# Patient Record
Sex: FEMALE | Race: OTHER | Employment: UNEMPLOYED | URBAN - METROPOLITAN AREA
[De-identification: names, ages, dates, MRNs, and addresses within clinical notes are randomized per-mention and may not be internally consistent; named-entity substitution may affect disease eponyms.]

---

## 2020-03-12 ENCOUNTER — OFFICE VISIT (OUTPATIENT)
Dept: NEUROLOGY | Age: 40
End: 2020-03-12

## 2020-03-12 VITALS
DIASTOLIC BLOOD PRESSURE: 82 MMHG | RESPIRATION RATE: 18 BRPM | HEART RATE: 95 BPM | OXYGEN SATURATION: 98 % | SYSTOLIC BLOOD PRESSURE: 122 MMHG

## 2020-03-12 DIAGNOSIS — H53.2 DIPLOPIA: Primary | ICD-10-CM

## 2020-03-12 DIAGNOSIS — R90.89 MRI OF BRAIN ABNORMAL: ICD-10-CM

## 2020-03-12 DIAGNOSIS — R20.2 PARESTHESIA: ICD-10-CM

## 2020-03-12 RX ORDER — PREDNISONE 10 MG/1
TABLET ORAL
COMMUNITY
End: 2021-03-31

## 2020-03-12 NOTE — PATIENT INSTRUCTIONS
10 [facility]   Statement to Patients  April 1, 2014      In an effort to ensure the large volume of patient prescription refills is processed in the most efficient and expeditious manner, we are asking our patients to assist us by calling your Pharmacy for all prescription refills, this will include also your  Mail Order Pharmacy. The pharmacy will contact our office electronically to continue the refill process. Please do not wait until the last minute to call your pharmacy. We need at least 48 hours (2days) to fill prescriptions. We also encourage you to call your pharmacy before going to  your prescription to make sure it is ready. With regard to controlled substance prescription refill requests (narcotic refills) that need to be picked up at our office, we ask your cooperation by providing us with at least 72 hours (3days) notice that you will need a refill. We will not refill narcotic prescription refill requests after 4:00pm on any weekday, Monday through Thursday, or after 2:00pm on Fridays, or on the weekends. We encourage everyone to explore another way of getting your prescription refill request processed using [employer], our patient web portal through our electronic medical record system. [employer] is an efficient and effective way to communicate your medication request directly to the office and  downloadable as an shila on your smart phone . [employer] also features a review functionality that allows you to view your medication list as well as leave messages for your physician. Are you ready to get connected? If so please review the attatched instructions or speak to any of our staff to get you set up right away! Thank you so much for your cooperation. Should you have any questions please contact our Practice Administrator.     The Physicians and Staff,  [facility]

## 2020-03-12 NOTE — PROGRESS NOTES
[facility] PATIENT EVALUATION/CONSULTATION       PATIENT NAME: Rizwan Heck    MRN: [id number]    REASON FOR CONSULTATION: \"MS\"    03/12/20      Previous records (physician notes, laboratory reports, and radiology reports) and imaging studies were reviewed and summarized. My recommendations will be communicated back to the patient's physician(s) via electronic medical record and/or by [address] mail. HISTORY OF PRESENT ILLNESS:  Rizwan Heck is a 46 y.o.  female presenting for evaluation of MS. Interview is conducted with the assistance of a Cape Verdean phone . She presented with L intranuclear ophthalmoplegia with recent hospitalization at [facility] per OSH records with associated paresthesias of the L face/LUE/LLE and diplopia. She completed IVMP 1g x 3 doses with subsequent steroid taper. MRI Brain reportedly showed b/l subcortical white matter hyperintensities with enhancement of the L pericallosal/PV white matter and sasha concerning for demyelination vs alternative etiologies not limited to vasculitis, less likely infectious, lymphoproliferative process. MRA H/N with patent vasculature per records. S/P LP 21 W/0R/elevated CSF MBP  She reports new right sided paresthesias, intermittent, since her discharge from [facility] involving her RLE. Medical access has been limited due to lack of insurance. Left paresthesias are resolved. She notes weakness generalized but worse into the lower extremities. She is able to walk without significant imbalance or falls. She reports resolution of diplopia. She does note blurred vision b/l.       PAST MEDICAL HISTORY:  None    PAST SURGICAL HISTORY:  Past Surgical History:   Procedure Laterality Date    HX LAP CHOLECYSTECTOMY  2015       FAMILY HISTORY:   Reviewed- no history of neurologic disease    SOCIAL HISTORY:  Social History     Socioeconomic History    Marital status: SINGLE     Spouse name: Not on file    Number of children: Not on file    Years of education: Not on file    Highest education level: Not on file   Tobacco Use    Smoking status: Never Smoker   Substance and Sexual Activity    Alcohol use: No    Drug use: No         MEDICATIONS:   Current Outpatient Medications   Medication Sig Dispense Refill    predniSONE (DELTASONE) 10 mg tablet Take  by mouth daily (with breakfast). 60mg x3 days 40mg x3 days 20mg x3 days 10mg x4 days then stop             ALLERGIES:  Allergies   Allergen Reactions    Influenza Virus Vaccine Tvs 2012-13 (18+) Cell Everton Rash         REVIEW OF SYSTEMS:  10 point ROS reviewed with patient. Please see scanned document under media. PHYSICAL EXAM:  Vital Signs:   Visit Vitals  /82   Pulse 95   Resp 18   SpO2 98%        General Medical Exam:  General:  Well appearing, comfortable, in no apparent distress. Eyes/ENT: see cranial nerve examination. Neck: No masses appreciated. Full range of motion without tenderness. Respiratory:  Clear to auscultation, good air entry bilaterally. Cardiac:  Regular rate and rhythm, no murmur. GI:  Soft, non-tender, non-distended abdomen. Bowel sounds normal. No masses, organomegaly. Extremities:  No deformities, edema, or skin discoloration. Skin:  No rashes or lesions. Neurological:  · Mental Status:  Alert and oriented to person, place, and time with fluent speech. · Cranial Nerves:   CNII/III/IV/VI: visual fields full to confrontation, EOMI, PERRL, no ptosis or nystagmus. CN V: Facial sensation intact bilaterally, masseter 5/5   CN VII: Facial muscles symmetric and strong   CN VIII: Hears finger rub well bilaterally, intact vestibular function   CN IX/X: Normal palatal movement   CN XI: Full strength shoulder shrug bilaterally   CN XII: Tongue protrusion full and midline without fasciculation or atrophy  · Motor: Normal tone and muscle bulk with no pronator drift.  .  Individual muscle group testing:  Shoulder abduction:   Left:5/5 Right : 5/5    Shoulder adduction:   Left:5/5   Right : 5/5    Elbow flexion:      Left:5/5   Right : 5/5  Elbow extension:    Left:5/5   Right : 5/5   Wrist flexion:    Left:5/5   Right : 5/5  Wrist extension:    Left:5/5   Right : 5/5  Arm pronation:   Left:5/5   Right : 5/5  Arm supination:   Left:5/5   Right : 5/5    Finger flexion:    Left:5/5   Right : 5/5    Finger extension:   Left:5/5   Right : 5/5   Finger abduction:  Left:5/5   Right : 5/5   Finger adduction:   Left:5/5   Right : 5/5  Hip flexion:     Left:5/5   Right : 5/5         Hip extension:   Left:5/5   Right : 5/5    Knee flexion:    Left:5/5   Right : 5/5    Knee extension:   Left:5/5   Right : 5/5    Dorsiflexion:     Left:5/5   Right : 5/5  Plantar flexion:    Left:5/5   Right : 5/5      · MSRs: No crossed adductors or clonus. RIGHT  LEFT   Brachioradialis 1+ 1+   Biceps 1+ 1+   Triceps 1+ 1+   Knee 1+ 1+   Achilles 1+ 1+        Plantar response Downward Downward          · Sensation: Normal and symmetric perception of pinprick, temperature, light touch, proprioception, and vibration; (-) Romberg. · Coordination: No dysmetria. Normal rapid alternating movements; finger-to-nose and heel-to- shin testing are within normal limits. · Gait: Normal native gait    PERTINENT DATA:  OUTSIDE RECORDS:  The patient provided outside medical records which were reviewed during the course of the visit. The relevant detail are summarized above. ASSESSMENT:      ICD-10-CM ICD-9-CM    1. Diplopia H53.2 368.2 MRI BRAIN W WO CONT      MRI CERV SPINE W WO CONT   2. Paresthesia R20.2 782.0 MRI BRAIN W WO CONT      MRI CERV SPINE W WO CONT   3.  MRI of brain abnormal R90.89 793.0 MRI BRAIN W WO CONT      MRI CERV SPINE W WO CONT      CBC WITH AUTOMATED DIFF      METABOLIC PANEL, COMPREHENSIVE      TSH AND FREE T4      VITAMIN B12      RPR      LYME AB TOTAL W/RFLX W BLOT      SED RATE (ESR)      ELIANA IFA W/REFLEX  CASCADE      COPPER      VZV AB, IGG VZV AB, IGM      KENISHA VIRUS DNA BY PCR, QL      HIV 1/2 AG/AB, 4TH GENERATION,W RFLX CONFIRM      ANGIOTENSIN CONVERTING ENZYME   46year old Maori speaking female presenting for evaluation of possible MS. She was noted with diplopia associated with L ANASTASIA, L paresthesias during admission to [address] [place] [address]. Subsequent MRI Brain reportedly showed b/l subcortical white matter hyperintensities with enhancement of the L pericallosal/PV white matter and sasha concerning for demyelination vs alternative etiologies not limited to vasculitis, less likely infectious, lymphoproliferative process. MRA H/N with patent vasculature per records. S/P LP 21 W/0R/elevated CSF MBP. Neurological examination is non-focal today. She reports new RLE paresthesias since her discharge as well as intermittent blurred vision. Originally L paresthesias and ANASTASIA appear resolved s/p IVMP and subsequent steroid taper. We discussed obtaining repeat intracranial imaging as well as cervical MRI to assess lesion burden, evaluate for active demyelination and to aid with exclusion of alternative etiologies. Will complete laboratory investigations to exclude other system inflammatory/infectious/metabolic disorders which may mimic demyelinating disease. If after additional evaluation the diagnosis of MS is secured, we will discuss initiation of appropriate options for disease modifying therapies. PLAN:  · MRI Brain/Cervical WWO  · CBC, CMP, TSH, B12, ESR, Lyme, Syphilis, JCV, Copper, ELIANA, ACE, HIV, VZV titers  · Obtain prior CSF OCB results      Follow-up and Dispositions    · Return in about 4 weeks (around 4/9/2020). Cheri Gonzalez DO  Staff Neurologist  Diplomate, American Board of Psychiatry & Neurology       CC Referring provider:    Ana Melendez PA-C

## 2020-03-18 DIAGNOSIS — F40.240 CLAUSTROPHOBIA: Primary | ICD-10-CM

## 2020-03-18 NOTE — TELEPHONE ENCOUNTER
Consuella Simpson called from coordination of care, she is trying to schedule this patient for her MRI, but the patient notes that  usually orders a sedative prior to test. Nahid Herzog would like a call back when ordered and she will notify the patient when she schedules her for her test(s).    Thanks

## 2020-03-19 NOTE — TELEPHONE ENCOUNTER
Spoke with glenn [phone] for patient. Informed her  could prescribe Ativan to help with her claustrophobia with her MRI. She agrees. Advised her of directions, and to have a . She does not have an allergy to Ativan.

## 2020-03-20 LAB
ACE SERPL-CCNC: 29 U/L (ref 14–82)
ALBUMIN SERPL-MCNC: 3.8 G/DL (ref 3.8–4.8)
ALBUMIN/GLOB SERPL: 1.3 {RATIO} (ref 1.2–2.2)
ALP SERPL-CCNC: 93 IU/L (ref 39–117)
ALT SERPL-CCNC: 27 IU/L (ref 0–32)
ANA TITR SER IF: NEGATIVE {TITER}
AST SERPL-CCNC: 32 IU/L (ref 0–40)
B BURGDOR IGG+IGM SER-ACNC: <0.91 ISR (ref 0–0.9)
BASOPHILS # BLD AUTO: 0 X10E3/UL (ref 0–0.2)
BASOPHILS NFR BLD AUTO: 1 %
BILIRUB SERPL-MCNC: <0.2 MG/DL (ref 0–1.2)
BUN SERPL-MCNC: 4 MG/DL (ref 6–20)
BUN/CREAT SERPL: 7 (ref 9–23)
CALCIUM SERPL-MCNC: 8.4 MG/DL (ref 8.7–10.2)
CHLORIDE SERPL-SCNC: 107 MMOL/L (ref 96–106)
CO2 SERPL-SCNC: 22 MMOL/L (ref 20–29)
COPPER SERPL-MCNC: 143 UG/DL (ref 72–166)
CREAT SERPL-MCNC: 0.6 MG/DL (ref 0.57–1)
EOSINOPHIL # BLD AUTO: 0 X10E3/UL (ref 0–0.4)
EOSINOPHIL NFR BLD AUTO: 1 %
ERYTHROCYTE [DISTWIDTH] IN BLOOD BY AUTOMATED COUNT: 13 % (ref 11.7–15.4)
ERYTHROCYTE [SEDIMENTATION RATE] IN BLOOD BY WESTERGREN METHOD: 30 MM/HR (ref 0–32)
GLOBULIN SER CALC-MCNC: 2.9 G/DL (ref 1.5–4.5)
GLUCOSE SERPL-MCNC: 101 MG/DL (ref 65–99)
HCT VFR BLD AUTO: 44.4 % (ref 34–46.6)
HGB BLD-MCNC: 14.6 G/DL (ref 11.1–15.9)
HIV 1+2 AB+HIV1 P24 AG SERPL QL IA: NON REACTIVE
IMM GRANULOCYTES # BLD AUTO: 0 X10E3/UL (ref 0–0.1)
IMM GRANULOCYTES NFR BLD AUTO: 0 %
JCPYV DNA SPEC QL NAA+PROBE: NEGATIVE
LYMPHOCYTES # BLD AUTO: 1.6 X10E3/UL (ref 0.7–3.1)
LYMPHOCYTES NFR BLD AUTO: 43 %
MCH RBC QN AUTO: 31.4 PG (ref 26.6–33)
MCHC RBC AUTO-ENTMCNC: 32.9 G/DL (ref 31.5–35.7)
MCV RBC AUTO: 96 FL (ref 79–97)
MONOCYTES # BLD AUTO: 0.3 X10E3/UL (ref 0.1–0.9)
MONOCYTES NFR BLD AUTO: 9 %
NEUTROPHILS # BLD AUTO: 1.7 X10E3/UL (ref 1.4–7)
NEUTROPHILS NFR BLD AUTO: 46 %
PLATELET # BLD AUTO: 243 X10E3/UL (ref 150–450)
POTASSIUM SERPL-SCNC: 3.8 MMOL/L (ref 3.5–5.2)
PROT SERPL-MCNC: 6.7 G/DL (ref 6–8.5)
RBC # BLD AUTO: 4.65 X10E6/UL (ref 3.77–5.28)
RPR SER QL: NON REACTIVE
SODIUM SERPL-SCNC: 142 MMOL/L (ref 134–144)
T4 FREE SERPL-MCNC: 1.23 NG/DL (ref 0.82–1.77)
TSH SERPL DL<=0.005 MIU/L-ACNC: 0.74 UIU/ML (ref 0.45–4.5)
VIT B12 SERPL-MCNC: 659 PG/ML (ref 232–1245)
VZV IGG SER IA-ACNC: 341 INDEX
VZV IGM SER IA-ACNC: <0.91 INDEX (ref 0–0.9)
WBC # BLD AUTO: 3.6 X10E3/UL (ref 3.4–10.8)

## 2020-03-20 RX ORDER — LORAZEPAM 0.5 MG/1
TABLET ORAL
Qty: 2 TAB | Refills: 0 | Status: SHIPPED | OUTPATIENT
Start: 2020-03-20 | End: 2021-03-31

## 2020-04-15 ENCOUNTER — TELEPHONE (OUTPATIENT)
Dept: NEUROLOGY | Age: 40
End: 2020-04-15

## 2020-04-15 NOTE — TELEPHONE ENCOUNTER
Speak with patient through . Informed patient that  states that she would like  patient to have MRI's done sooner than end of May. She verbalized understanding. Requesting the scheduling service call her to reschedule for a sooner appointment.      #[phone]

## 2020-04-18 ENCOUNTER — HOSPITAL ENCOUNTER (OUTPATIENT)
Dept: MRI IMAGING | Age: 40
Discharge: HOME | End: 2020-04-18
Attending: PSYCHIATRY & NEUROLOGY
Payer: SUBSIDIZED

## 2020-04-18 DIAGNOSIS — H53.2 DIPLOPIA: ICD-10-CM

## 2020-04-18 DIAGNOSIS — R90.89 MRI OF BRAIN ABNORMAL: ICD-10-CM

## 2020-04-18 DIAGNOSIS — R20.2 PARESTHESIA: ICD-10-CM

## 2020-04-18 PROCEDURE — A9575 INJ GADOTERATE MEGLUMI 0.1ML: HCPCS | Performed by: PSYCHIATRY & NEUROLOGY

## 2020-04-18 PROCEDURE — 70553 MRI BRAIN STEM W/O & W/DYE: CPT

## 2020-04-18 PROCEDURE — 72156 MRI NECK SPINE W/O & W/DYE: CPT

## 2020-04-18 PROCEDURE — 74011250636 HC RX REV CODE- 250/636: Performed by: PSYCHIATRY & NEUROLOGY

## 2020-04-18 RX ORDER — GADOTERATE MEGLUMINE 376.9 MG/ML
18 INJECTION INTRAVENOUS
Status: COMPLETED | OUTPATIENT
Start: 2020-04-18 | End: 2020-04-18

## 2020-04-18 RX ADMIN — GADOTERATE MEGLUMINE 18 ML: 376.9 INJECTION INTRAVENOUS at 13:00

## 2020-04-29 ENCOUNTER — VIRTUAL VISIT (OUTPATIENT)
Dept: NEUROLOGY | Age: 40
End: 2020-04-29

## 2020-04-29 VITALS — WEIGHT: 187 LBS | HEIGHT: 60 IN | BODY MASS INDEX: 36.71 KG/M2 | RESPIRATION RATE: 18 BRPM

## 2020-04-29 DIAGNOSIS — Z86.69 H/O DIPLOPIA: ICD-10-CM

## 2020-04-29 DIAGNOSIS — R20.2 PARESTHESIA: ICD-10-CM

## 2020-04-29 DIAGNOSIS — G35 MULTIPLE SCLEROSIS (HCC): Primary | ICD-10-CM

## 2020-04-29 NOTE — PROGRESS NOTES
Neurology Clinic Follow up Note    Patient ID:  Efrain Swann  [id number]  15 y.o.  1980      Ms. Jarod Patrick is here for follow up today of  Chief Complaint   Patient presents with    Multiple Sclerosis      This is a telemedicine visit that was performed with the originating site at [facility] and the distant site at patient's home. Verbal consent to participate in video visit was obtained. The patient was identified by name and date of birth. This visit occurred during the Coronavirus [phone]) [facility] Emergency. I discussed with the patient the nature of our telemedicine visits, that:     I would evaluate the patient and recommend diagnostics and treatments based on my assessment   Our sessions are not being recorded and that personal health information is protected   Our team would provide follow up care in person if/when the patient needs it      Last Appointment With Me:  3/12/2020     \"39 y.o.  female presenting for evaluation of MS. Interview is conducted with the assistance of a Kiswahili phone . She presented with L intranuclear ophthalmoplegia with recent hospitalization at [address] [place] [address] per OSH records with associated paresthesias of the L face/LUE/LLE and diplopia. She completed IVMP 1g x 3 doses with subsequent steroid taper. MRI Brain reportedly showed b/l subcortical white matter hyperintensities with enhancement of the L pericallosal/PV white matter and sasha concerning for demyelination vs alternative etiologies not limited to vasculitis, less likely infectious, lymphoproliferative process. MRA H/N with patent vasculature per records. S/P LP 21 W/0R/elevated CSF MBP  She reports new right sided paresthesias, intermittent, since her discharge from [address] [place] [address] involving her RLE. Medical access has been limited due to lack of insurance. Left paresthesias are resolved. She notes weakness generalized but worse into the lower extremities.   She is able to walk without significant imbalance or falls. She reports resolution of diplopia. She does note blurred vision b/l. \"   Interval History:    [id number]  Patient denies new focal neurologic deficits including paresthesias, focal weakness, vision loss. Diplopia has resolved. She denies gait instability/falls, memory impairment. She reports some recent dysuria being investigated by her PCP. MRI Brain completed without evidence of active demyelination. PMHx/ PSHx/ FHx/ SHx:  Reviewed and unchanged previous visit. Past Medical History:   Diagnosis Date    Dysuria     Elevated lipids     Multiple sclerosis (HCC)          ROS:  Comprehensive review of systems negative except for as noted above. Objective:       Meds:  Current Outpatient Medications   Medication Sig Dispense Refill    LORazepam (ATIVAN) 0.5 mg tablet Take 1 tab 30 minutes prior to imaging. May repeat once if needed. Max daily dose 1mg. 2 Tab 0    predniSONE (DELTASONE) 10 mg tablet Take  by mouth daily (with breakfast). 60mg x3 days 40mg x3 days 20mg x3 days 10mg x4 days then stop         Exam:  Visit Vitals  Resp 18   Ht 5' (1.524 m)   Wt 84.8 kg (187 lb)   BMI 36.52 kg/m²   Due to this being a TeleHealth evaluation, many elements of the physical examination are unable to be assessed. Unable to connect for examination. Speech is fluent, alert/oriented x 3. LABS  Results for orders placed or performed in visit on 03/12/20   CBC WITH AUTOMATED DIFF   Result Value Ref Range    WBC 3.6 3.4 - 10.8 x10E3/uL    RBC 4.65 3.77 - 5.28 x10E6/uL    HGB 14.6 11.1 - 15.9 g/dL    HCT 44.4 34.0 - 46.6 %    MCV 96 79 - 97 fL    MCH 31.4 26.6 - 33.0 pg    MCHC 32.9 31.5 - 35.7 g/dL    RDW 13.0 11.7 - 15.4 %    PLATELET 047 289 - 469 x10E3/uL    NEUTROPHILS 46 Not Estab. %    Lymphocytes 43 Not Estab. %    MONOCYTES 9 Not Estab. %    EOSINOPHILS 1 Not Estab. %    BASOPHILS 1 Not Estab. %    ABS.  NEUTROPHILS 1.7 1.4 - 7.0 x10E3/uL Abs Lymphocytes 1.6 0.7 - 3.1 x10E3/uL    ABS. MONOCYTES 0.3 0.1 - 0.9 x10E3/uL    ABS. EOSINOPHILS 0.0 0.0 - 0.4 x10E3/uL    ABS. BASOPHILS 0.0 0.0 - 0.2 x10E3/uL    IMMATURE GRANULOCYTES 0 Not Estab. %    ABS. IMM. GRANS. 0.0 0.0 - 0.1 Z04L4/HB   METABOLIC PANEL, COMPREHENSIVE   Result Value Ref Range    Glucose 101 (H) 65 - 99 mg/dL    BUN 4 (L) 6 - 20 mg/dL    Creatinine 0.60 0.57 - 1.00 mg/dL    GFR est non- >59 mL/min/1.73    GFR est  >59 mL/min/1.73    BUN/Creatinine ratio 7 (L) 9 - 23    Sodium 142 134 - 144 mmol/L    Potassium 3.8 3.5 - 5.2 mmol/L    Chloride 107 (H) 96 - 106 mmol/L    CO2 22 20 - 29 mmol/L    Calcium 8.4 (L) 8.7 - 10.2 mg/dL    Protein, total 6.7 6.0 - 8.5 g/dL    Albumin 3.8 3.8 - 4.8 g/dL    GLOBULIN, TOTAL 2.9 1.5 - 4.5 g/dL    A-G Ratio 1.3 1.2 - 2.2    Bilirubin, total <0.2 0.0 - 1.2 mg/dL    Alk.  phosphatase 93 39 - 117 IU/L    AST (SGOT) 32 0 - 40 IU/L    ALT (SGPT) 27 0 - 32 IU/L   TSH AND FREE T4   Result Value Ref Range    TSH 0.744 0.450 - 4.500 uIU/mL    T4, Free 1.23 0.82 - 1.77 ng/dL   VITAMIN B12   Result Value Ref Range    Vitamin B12 659 232 - 1,245 pg/mL   RPR   Result Value Ref Range    RPR Non Reactive Non Reactive   LYME AB TOTAL W/RFLX W BLOT   Result Value Ref Range    Lyme Ab, IgG/IgM <0.91 0.00 - 0.90 ISR   SED RATE (ESR)   Result Value Ref Range    Sed rate (ESR) 30 0 - 32 mm/hr   ELIANA IFA W/REFLEX  CASCADE   Result Value Ref Range    Antinuclear Abs, IFA Negative    COPPER   Result Value Ref Range    Copper, serum 143 72 - 166 ug/dL   VZV AB, IGG   Result Value Ref Range    VARICELLA ZOSTER  Immune >165 index   VZV AB, IGM   Result Value Ref Range    V. zoster Ab, IgM <0.91 0.00 - 0.90 index   KENISHA VIRUS DNA BY PCR, QL   Result Value Ref Range    KENISHA VIRUS DNA,PCR (WHOLE BLOOD) Negative Negative   HIV 1/2 AG/AB, 4TH GENERATION,W RFLX CONFIRM   Result Value Ref Range    HIV SCREEN 4TH GENERATION WRFX Non Reactive Non Reactive   ANGIOTENSIN CONVERTING ENZYME   Result Value Ref Range    Angiotensin Converting Enzyme (ACE) 29 14 - 82 U/L       IMAGING:  MRI Results (maximum last 3): [place] from [place]/20   MRI CERV SPINE W WO CONT    Narrative EXAM: MRI CERV SPINE W WO CONT  Clinical history: Diplopia, intranuclear ophthalmoplegia. Evaluate for  demyelination. INDICATION: h/o ANASTASIA, paresthesias eval for demyelination vs other etiology. Diplopia    COMPARISON: None    TECHNIQUE: MR imaging of the cervical spine was performed using the following  sequences: sagittal T1, T2, STIR;  axial T2, T1 prior to and following contrast  administration. CONTRAST: 18 mL of Dotarem. FINDINGS:    Increased T2 signal intensity foci in the sasha and medulla. No abnormal cord  signal intensity. Vertebral body heights are maintained. Marrow signal is  normal.    The craniocervical junction is intact. The course, caliber, and signal intensity  of the spinal cord are normal. There is no pathologic intrathecal enhancement. The paraspinal soft tissues are within normal limits. The left vertebral artery  is dominant. C2-C3: No herniation or stenosis. C3-C4: No herniation or stenosis. C4-C5: No herniation or stenosis. C5-C6: No herniation or stenosis. C6-C7: No herniation or stenosis. C7-T1: No herniation or stenosis. Impression IMPRESSION:  Normal MRI of the cervical spine. No evidence of cervical cord demyelinating disease burden. MRI BRAIN W WO CONT    Narrative EXAM:  MRI BRAIN W WO CONT  Clinical history: Paresthesias, possible demyelination  INDICATION:    h/o ANASTASIA, paresthesias eval for demyelination vs other etiology    COMPARISON:  None. CONTRAST: 18 ml Dotarem. TECHNIQUE:    Multiplanar multisequence acquisition without and with contrast of the brain.     FINDINGS:  There are minimal periventricular and scattered foci of abnormal increased T2  signal intensity noted, centrum semiovale, sasha and medulla. There is no  abnormal postcontrast enhancement to suggest active demyelination. Cavernous  sinuses are symmetric. There is no intracranial mass, hemorrhage or acute infarction. Headaches. There is no abnormal parenchymal enhancement. There is no abnormal  meningeal enhancement demonstrated. The brain architecture is normal. There is  no evidence of midline shift or mass-effect. The ventricles are normal in size,  position and configuration. There are no extra-axial fluid collections. Major  intracranial vascular flow-voids are unremarkable. The orbits are grossly  symmetric. Dural venous sinuses are grossly unremarkable. There is no Chiari or  syrinx. Pituitary and infundibulum grossly unremarkable. Cerebellopontine angles  are unremarkable. No skull base mass is identified. Cavernous sinuses are  symmetric. The mastoid air cells and are well pneumatized and clear. Impression IMPRESSION:  Minimal abnormal increased T2 signal intensity predominantly periventricular but  also noted within the sasha and pontomedullary junction, differential includes  demyelinating process. No intracranial mass, hemorrhage or evidence of acute infarction. Otherwise unremarkable MRI of the brain. Assessment:     Encounter Diagnoses     ICD-10-CM ICD-9-CM   1. Multiple sclerosis ([facility] [address] G35 340   2. Paresthesia R20.2 782.0   3. H/O diplopia Z80.75 V16.53   46year old Kuwaiti speaking female here for f/u due to abnormal MRI and h/o diplopia associated with L ANASTASIA, L paresthesias during admission to [address] [place] [address]. Subsequent MRI Brain revealed b/l subcortical white matter hyperintensities with enhancement of the L pericallosal/PV white matter and sasha concerning for active demyelination vs alternative etiology. MRA H/N with patent vasculature per records. S/P LP 21 W/0R/elevated CSF MBP, 0 OCBs. Neurological examination is non-focal.  L paresthesias and ANASTASIA appear resolved s/p IVMP and subsequent steroid taper. Repeat neuroimaging with contrast was reviewed today showing no evidence of active demyelination, mild T2 primarily subcortical hyperintensities mainly localized to the pericallosal/PV white matter and sasha. No evidence of cervical lesions. Laboratory investigations completed without evidence of underlying systemic inflammatory/infectious or metabolic disorders which may mimic demyelinating disease. We reviewed the multiple sclerosis diagnosis, prognosis, and implications. We reviewed the 3-pronged treatment strategy: treating acute flares, using preventative treatments to prevent future relapses and brain lesions, and treating residual symptoms. For long-term treatment, I recommend initiation of Copaxone. Discussed medication dosage, usage, goals of therapy, and side effects. Information kit was provided; start-up form completed. Plan:   Initiate Copaxone injections    Follow-up and Dispositions    · Return in about 3 months (around 7/29/2020).        Signed:  Heraclio Hill DO  4/29/2020  9:09 AM

## 2020-05-07 ENCOUNTER — TELEPHONE (OUTPATIENT)
Dept: NEUROLOGY | Age: 40
End: 2020-05-07

## 2020-07-28 NOTE — TELEPHONE ENCOUNTER
\"Z\", pt's , calling stating Copaxone is not available and insurance company stated Creig Ramp is a good alternative.  Please call back

## 2020-07-29 NOTE — TELEPHONE ENCOUNTER
Spoke with SHANON, informed her  says it's okay for patient to take generic copaxone. She is requesting last office note be faxed to her.

## 2020-07-31 NOTE — TELEPHONE ENCOUNTER
Called [employer] to update Rx to generic for Copaxone. Luke Toribio states [employer] does not distribute generic Copaxone. To call patient's insurance. Called [employer]- Closed at 12:30pm today. Will attempt again next week.

## 2020-08-11 NOTE — TELEPHONE ENCOUNTER
Spoke with Radha Rodríguez, rep for Copaxone. She states to send generic Copaxone order to [facility] on [address] [facility].

## 2020-08-26 RX ORDER — GLATIRAMER 40 MG/ML
40 INJECTION, SOLUTION SUBCUTANEOUS
Qty: 13 SYRINGE | Refills: 5 | Status: SHIPPED | OUTPATIENT
Start: 2020-08-27 | End: 2021-03-31

## 2020-08-27 ENCOUNTER — TELEPHONE (OUTPATIENT)
Dept: NEUROLOGY | Facility: CLINIC | Age: 40
End: 2020-08-27

## 2020-09-01 ENCOUNTER — TELEPHONE (OUTPATIENT)
Dept: NEUROLOGY | Facility: CLINIC | Age: 40
End: 2020-09-01

## 2020-09-01 NOTE — TELEPHONE ENCOUNTER
----- Message from Yuan Lock sent at 9/1/2020  3:00 PM EDT -----  Regarding: Dr Shaneka Woodall  Patient return call    Caller's first and last name and relationship (if not the patient): /[facility]      Best contact number(s):[phone]      Whose call is being returned: Nancy Madison      Details to clarify the request: regarding information on medication pt was prescribed      Yuan Lock

## 2020-09-02 NOTE — TELEPHONE ENCOUNTER
Spoke with SHANON, she states she previously tried doing patient assistance but it was an issue because they wanted to know if patient was documented.  Informed her I would call patient assistance and try to get more information

## 2020-09-10 NOTE — TELEPHONE ENCOUNTER
Spoke with patient assistance with [employer] for Copaxone. They do not supply assistance to patient's who are undocumented. I will contact [employer] for patient to see if they can be of any assistance.

## 2020-09-14 NOTE — TELEPHONE ENCOUNTER
Spoke with Miguel Li with [employer]. She states [facility] will be able to assist patient with getting medication, she will be by later today with form.

## 2020-09-15 ENCOUNTER — TELEPHONE (OUTPATIENT)
Dept: NEUROLOGY | Facility: CLINIC | Age: 40
End: 2020-09-15

## 2020-09-15 DIAGNOSIS — G35 MULTIPLE SCLEROSIS (HCC): Primary | ICD-10-CM

## 2020-09-16 NOTE — TELEPHONE ENCOUNTER
Left message for Z that  wants patient to start Rebif for her MS but will need to have labs completed before hand. Informed her I can fax or if she wants to bring patient to office and go to [address] [place] [address] that works as well.

## 2020-09-23 ENCOUNTER — TELEPHONE (OUTPATIENT)
Dept: NEUROLOGY | Facility: CLINIC | Age: 40
End: 2020-09-23

## 2020-09-23 NOTE — TELEPHONE ENCOUNTER
Calling stating she has been working with Juancarlos Grip for about 2 months and needs a call back from Juancarlos James. Medication is wearing out (almost time to ) and they are very concerned about it.

## 2020-09-26 LAB
ALBUMIN SERPL-MCNC: 4.1 G/DL (ref 3.8–4.8)
ALBUMIN/GLOB SERPL: 1.4 {RATIO} (ref 1.2–2.2)
ALP SERPL-CCNC: 106 IU/L (ref 39–117)
ALT SERPL-CCNC: 18 IU/L (ref 0–32)
AST SERPL-CCNC: 22 IU/L (ref 0–40)
BASOPHILS # BLD AUTO: 0 X10E3/UL (ref 0–0.2)
BASOPHILS NFR BLD AUTO: 1 %
BILIRUB SERPL-MCNC: 0.2 MG/DL (ref 0–1.2)
BUN SERPL-MCNC: 8 MG/DL (ref 6–20)
BUN/CREAT SERPL: 10 (ref 9–23)
CALCIUM SERPL-MCNC: 8.9 MG/DL (ref 8.7–10.2)
CHLORIDE SERPL-SCNC: 106 MMOL/L (ref 96–106)
CO2 SERPL-SCNC: 21 MMOL/L (ref 20–29)
CREAT SERPL-MCNC: 0.77 MG/DL (ref 0.57–1)
EOSINOPHIL # BLD AUTO: 0.1 X10E3/UL (ref 0–0.4)
EOSINOPHIL NFR BLD AUTO: 1 %
ERYTHROCYTE [DISTWIDTH] IN BLOOD BY AUTOMATED COUNT: 13.1 % (ref 11.7–15.4)
GLOBULIN SER CALC-MCNC: 3 G/DL (ref 1.5–4.5)
GLUCOSE SERPL-MCNC: 96 MG/DL (ref 65–99)
HCT VFR BLD AUTO: 43.8 % (ref 34–46.6)
HGB BLD-MCNC: 14.8 G/DL (ref 11.1–15.9)
IMM GRANULOCYTES # BLD AUTO: 0 X10E3/UL (ref 0–0.1)
IMM GRANULOCYTES NFR BLD AUTO: 0 %
LYMPHOCYTES # BLD AUTO: 1.7 X10E3/UL (ref 0.7–3.1)
LYMPHOCYTES NFR BLD AUTO: 27 %
MCH RBC QN AUTO: 31.7 PG (ref 26.6–33)
MCHC RBC AUTO-ENTMCNC: 33.8 G/DL (ref 31.5–35.7)
MCV RBC AUTO: 94 FL (ref 79–97)
MONOCYTES # BLD AUTO: 0.5 X10E3/UL (ref 0.1–0.9)
MONOCYTES NFR BLD AUTO: 8 %
NEUTROPHILS # BLD AUTO: 3.9 X10E3/UL (ref 1.4–7)
NEUTROPHILS NFR BLD AUTO: 63 %
PLATELET # BLD AUTO: 262 X10E3/UL (ref 150–450)
POTASSIUM SERPL-SCNC: 4.7 MMOL/L (ref 3.5–5.2)
PROT SERPL-MCNC: 7.1 G/DL (ref 6–8.5)
RBC # BLD AUTO: 4.67 X10E6/UL (ref 3.77–5.28)
SODIUM SERPL-SCNC: 141 MMOL/L (ref 134–144)
TSH SERPL DL<=0.005 MIU/L-ACNC: 1.12 UIU/ML (ref 0.45–4.5)
WBC # BLD AUTO: 6.2 X10E3/UL (ref 3.4–10.8)

## 2020-09-28 ENCOUNTER — DOCUMENTATION ONLY (OUTPATIENT)
Dept: NEUROLOGY | Facility: CLINIC | Age: 40
End: 2020-09-28

## 2020-10-30 ENCOUNTER — TELEPHONE (OUTPATIENT)
Dept: NEUROLOGY | Facility: CLINIC | Age: 40
End: 2020-10-30

## 2020-10-30 NOTE — TELEPHONE ENCOUNTER
----- Message from Rut Turcios sent at 10/30/2020  4:22 PM EDT -----  Regarding: Dr. Mathieu Soto telephone    Caller's first and last name: n/a  Reason for call: questions  Callback required yes/no and why: yes  Best contact number(s):[phone]  Details to clarify the request: pt started treatment on Monday, pt is also still breastfeeding pt wants to [facility] if the treatment will affect the baby

## 2020-11-02 NOTE — TELEPHONE ENCOUNTER
Spoke with SHANON patient's . Informed her per -Limit human data during lactation but generally considered ok to continue breastfeeding with Rebif.     Scheduled follow up for 12/2 at 11am.

## 2020-12-02 ENCOUNTER — OFFICE VISIT (OUTPATIENT)
Dept: NEUROLOGY | Age: 40
End: 2020-12-02
Payer: SUBSIDIZED

## 2020-12-02 VITALS
HEART RATE: 65 BPM | OXYGEN SATURATION: 98 % | DIASTOLIC BLOOD PRESSURE: 82 MMHG | WEIGHT: 187 LBS | BODY MASS INDEX: 36.52 KG/M2 | SYSTOLIC BLOOD PRESSURE: 130 MMHG | RESPIRATION RATE: 18 BRPM

## 2020-12-02 DIAGNOSIS — R90.89 MRI OF BRAIN ABNORMAL: ICD-10-CM

## 2020-12-02 DIAGNOSIS — G35 MULTIPLE SCLEROSIS (HCC): Primary | ICD-10-CM

## 2020-12-02 DIAGNOSIS — Z86.69 H/O DIPLOPIA: ICD-10-CM

## 2020-12-02 DIAGNOSIS — F40.240 CLAUSTROPHOBIA: Primary | ICD-10-CM

## 2020-12-02 PROCEDURE — 99214 OFFICE O/P EST MOD 30 MIN: CPT | Performed by: PSYCHIATRY & NEUROLOGY

## 2020-12-02 RX ORDER — ACETAMINOPHEN 325 MG/1
TABLET ORAL
COMMUNITY

## 2020-12-02 RX ORDER — INTERFERON BETA-1A 44 UG/.5ML
INJECTION, SOLUTION SUBCUTANEOUS
COMMUNITY
End: 2021-09-07 | Stop reason: SDUPTHER

## 2020-12-02 RX ORDER — LORAZEPAM 0.5 MG/1
TABLET ORAL
Qty: 2 TAB | Refills: 0 | Status: SHIPPED | OUTPATIENT
Start: 2020-12-02 | End: 2021-03-31

## 2020-12-02 NOTE — PATIENT INSTRUCTIONS
10 [facility]   Statement to Patients  April 1, 2014      In an effort to ensure the large volume of patient prescription refills is processed in the most efficient and expeditious manner, we are asking our patients to assist us by calling your Pharmacy for all prescription refills, this will include also your  Mail Order Pharmacy. The pharmacy will contact our office electronically to continue the refill process. Please do not wait until the last minute to call your pharmacy. We need at least 48 hours (2days) to fill prescriptions. We also encourage you to call your pharmacy before going to  your prescription to make sure it is ready. With regard to controlled substance prescription refill requests (narcotic refills) that need to be picked up at our office, we ask your cooperation by providing us with at least 72 hours (3days) notice that you will need a refill. We will not refill narcotic prescription refill requests after 4:00pm on any weekday, Monday through Thursday, or after 2:00pm on Fridays, or on the weekends. We encourage everyone to explore another way of getting your prescription refill request processed using Bestowed, our patient web portal through our electronic medical record system. Bestowed is an efficient and effective way to communicate your medication request directly to the office and  downloadable as an shila on your smart phone . Bestowed also features a review functionality that allows you to view your medication list as well as leave messages for your physician. Are you ready to get connected? If so please review the attatched instructions or speak to any of our staff to get you set up right away! Thank you so much for your cooperation. Should you have any questions please contact our Practice Administrator.     The Physicians and Staff,  [facility]

## 2020-12-03 LAB
ALBUMIN SERPL-MCNC: 3.7 G/DL (ref 3.8–4.8)
ALBUMIN/GLOB SERPL: 1.2 {RATIO} (ref 1.2–2.2)
ALP SERPL-CCNC: 100 IU/L (ref 39–117)
ALT SERPL-CCNC: 32 IU/L (ref 0–32)
AST SERPL-CCNC: 34 IU/L (ref 0–40)
BASOPHILS # BLD AUTO: 0 X10E3/UL (ref 0–0.2)
BASOPHILS NFR BLD AUTO: 0 %
BILIRUB SERPL-MCNC: 0.3 MG/DL (ref 0–1.2)
BUN SERPL-MCNC: 9 MG/DL (ref 6–20)
BUN/CREAT SERPL: 14 (ref 9–23)
CALCIUM SERPL-MCNC: 8.9 MG/DL (ref 8.7–10.2)
CHLORIDE SERPL-SCNC: 105 MMOL/L (ref 96–106)
CO2 SERPL-SCNC: 22 MMOL/L (ref 20–29)
CREAT SERPL-MCNC: 0.64 MG/DL (ref 0.57–1)
EOSINOPHIL # BLD AUTO: 0 X10E3/UL (ref 0–0.4)
EOSINOPHIL NFR BLD AUTO: 1 %
ERYTHROCYTE [DISTWIDTH] IN BLOOD BY AUTOMATED COUNT: 12.9 % (ref 11.7–15.4)
GLOBULIN SER CALC-MCNC: 3.1 G/DL (ref 1.5–4.5)
GLUCOSE SERPL-MCNC: 109 MG/DL (ref 65–99)
HCT VFR BLD AUTO: 44.3 % (ref 34–46.6)
HGB BLD-MCNC: 15.2 G/DL (ref 11.1–15.9)
IMM GRANULOCYTES # BLD AUTO: 0 X10E3/UL (ref 0–0.1)
IMM GRANULOCYTES NFR BLD AUTO: 0 %
LYMPHOCYTES # BLD AUTO: 1.8 X10E3/UL (ref 0.7–3.1)
LYMPHOCYTES NFR BLD AUTO: 39 %
MCH RBC QN AUTO: 31.4 PG (ref 26.6–33)
MCHC RBC AUTO-ENTMCNC: 34.3 G/DL (ref 31.5–35.7)
MCV RBC AUTO: 92 FL (ref 79–97)
MONOCYTES # BLD AUTO: 0.3 X10E3/UL (ref 0.1–0.9)
MONOCYTES NFR BLD AUTO: 7 %
NEUTROPHILS # BLD AUTO: 2.4 X10E3/UL (ref 1.4–7)
NEUTROPHILS NFR BLD AUTO: 53 %
PLATELET # BLD AUTO: 274 X10E3/UL (ref 150–450)
POTASSIUM SERPL-SCNC: 4 MMOL/L (ref 3.5–5.2)
PROT SERPL-MCNC: 6.8 G/DL (ref 6–8.5)
RBC # BLD AUTO: 4.84 X10E6/UL (ref 3.77–5.28)
SODIUM SERPL-SCNC: 141 MMOL/L (ref 134–144)
TSH SERPL DL<=0.005 MIU/L-ACNC: 0.86 UIU/ML (ref 0.45–4.5)
WBC # BLD AUTO: 4.5 X10E3/UL (ref 3.4–10.8)

## 2020-12-18 ENCOUNTER — HOSPITAL ENCOUNTER (OUTPATIENT)
Dept: MRI IMAGING | Age: 40
Discharge: HOME | End: 2020-12-18
Attending: PSYCHIATRY & NEUROLOGY
Payer: SUBSIDIZED

## 2020-12-18 VITALS — BODY MASS INDEX: 37.11 KG/M2 | WEIGHT: 190 LBS

## 2020-12-18 DIAGNOSIS — G35 MULTIPLE SCLEROSIS (HCC): ICD-10-CM

## 2020-12-18 PROCEDURE — A9575 INJ GADOTERATE MEGLUMI 0.1ML: HCPCS | Performed by: PSYCHIATRY & NEUROLOGY

## 2020-12-18 PROCEDURE — 70553 MRI BRAIN STEM W/O & W/DYE: CPT

## 2020-12-18 PROCEDURE — 74011250636 HC RX REV CODE- 250/636: Performed by: PSYCHIATRY & NEUROLOGY

## 2020-12-18 RX ORDER — GADOTERATE MEGLUMINE 376.9 MG/ML
17 INJECTION INTRAVENOUS
Status: COMPLETED | OUTPATIENT
Start: 2020-12-18 | End: 2020-12-18

## 2020-12-18 RX ADMIN — GADOTERATE MEGLUMINE 17 ML: 376.9 INJECTION INTRAVENOUS at 11:03

## 2021-03-31 ENCOUNTER — OFFICE VISIT (OUTPATIENT)
Dept: NEUROLOGY | Age: 41
End: 2021-03-31
Payer: SUBSIDIZED

## 2021-03-31 VITALS
DIASTOLIC BLOOD PRESSURE: 70 MMHG | SYSTOLIC BLOOD PRESSURE: 118 MMHG | HEART RATE: 92 BPM | RESPIRATION RATE: 18 BRPM | OXYGEN SATURATION: 98 %

## 2021-03-31 DIAGNOSIS — G35 MULTIPLE SCLEROSIS (HCC): Primary | ICD-10-CM

## 2021-03-31 DIAGNOSIS — H53.2 DIPLOPIA: ICD-10-CM

## 2021-03-31 PROCEDURE — 99214 OFFICE O/P EST MOD 30 MIN: CPT | Performed by: PSYCHIATRY & NEUROLOGY

## 2021-03-31 NOTE — PATIENT INSTRUCTIONS
10 [facility]   Statement to Patients  April 1, 2014      In an effort to ensure the large volume of patient prescription refills is processed in the most efficient and expeditious manner, we are asking our patients to assist us by calling your Pharmacy for all prescription refills, this will include also your  Mail Order Pharmacy. The pharmacy will contact our office electronically to continue the refill process. Please do not wait until the last minute to call your pharmacy. We need at least 48 hours (2days) to fill prescriptions. We also encourage you to call your pharmacy before going to  your prescription to make sure it is ready. With regard to controlled substance prescription refill requests (narcotic refills) that need to be picked up at our office, we ask your cooperation by providing us with at least 72 hours (3days) notice that you will need a refill. We will not refill narcotic prescription refill requests after 4:00pm on any weekday, Monday through Thursday, or after 2:00pm on Fridays, or on the weekends. We encourage everyone to explore another way of getting your prescription refill request processed using 4meee, our patient web portal through our electronic medical record system. 4meee is an efficient and effective way to communicate your medication request directly to the office and  downloadable as an shila on your smart phone . 4meee also features a review functionality that allows you to view your medication list as well as leave messages for your physician. Are you ready to get connected? If so please review the attatched instructions or speak to any of our staff to get you set up right away! Thank you so much for your cooperation. Should you have any questions please contact our Practice Administrator.     The Physicians and Staff,  [facility] [address] [place] [address] Neurology Clinic   Statement to Patients  April 1, 2014      In an effort to ensure the large volume of patient prescription refills is processed in the most efficient and expeditious manner, we are asking our patients to assist us by calling your Pharmacy for all prescription refills, this will include also your  Mail Order Pharmacy. The pharmacy will contact our office electronically to continue the refill process. Please do not wait until the last minute to call your pharmacy. We need at least 48 hours (2days) to fill prescriptions. We also encourage you to call your pharmacy before going to  your prescription to make sure it is ready. With regard to controlled substance prescription refill requests (narcotic refills) that need to be picked up at our office, we ask your cooperation by providing us with at least 72 hours (3days) notice that you will need a refill. We will not refill narcotic prescription refill requests after 4:00pm on any weekday, Monday through Thursday, or after 2:00pm on Fridays, or on the weekends. We encourage everyone to explore another way of getting your prescription refill request processed using Ricebook, our patient web portal through our electronic medical record system. Ricebook is an efficient and effective way to communicate your medication request directly to the office and  downloadable as an shila on your smart phone . Ricebook also features a review functionality that allows you to view your medication list as well as leave messages for your physician. Are you ready to get connected? If so please review the attatched instructions or speak to any of our staff to get you set up right away! Thank you so much for your cooperation. Should you have any questions please contact our Practice Administrator.     The Physicians and Staff,  [address] [place] [address] Neurology [facility]

## 2021-03-31 NOTE — PROGRESS NOTES
Neurology Clinic Follow up Note    Patient ID:  Tia Benson  [id number]  45 y.o.  1980      Ms. Viviane Sánchez is here for follow up today of  Chief Complaint   Patient presents with    Multiple Sclerosis     Previous records (physician notes, laboratory reports, and radiology reports) and imaging studies were reviewed and summarized. My recommendations will be communicated back to the patient's physician(s) via electronic medical record and/or by [address] mail. Last Appointment With Me:  12/2/2020    \"40 y.o.  female presenting for evaluation of MS. Interview is conducted with the assistance of a Burmese phone . She presented with L intranuclear ophthalmoplegia with recent hospitalization at [facility] per OSH records with associated paresthesias of the L face/LUE/LLE and diplopia. She completed IVMP 1g x 3 doses with subsequent steroid taper. MRI Brain reportedly showed b/l subcortical white matter hyperintensities with enhancement of the L pericallosal/PV white matter and sasha concerning for demyelination vs alternative etiologies not limited to vasculitis, less likely infectious, lymphoproliferative process. MRA H/N with patent vasculature per records. S/P LP 21 W/0R/elevated CSF MBP  She reports new right sided paresthesias, intermittent, since her discharge from [facility] involving her RLE. Medical access has been limited due to lack of insurance. Left paresthesias are resolved. She notes weakness generalized but worse into the lower extremities. She is able to walk without significant imbalance or falls. She reports resolution of diplopia. She does note blurred vision b/l. \"   Interval History:   Burmese phone  assist with translation today. Patient denies new focal neurologic deficits including paresthesias, focal weakness, vision loss, neuropathic pain symptoms. She notes a sensation of water inside her ears b/l intermittently. She reports ongoing fatigue. Diplopia is improved, worse when overheated. She reports occasional blurred vision. She denies gait instability/falls. She reports some short term memory impairment, unchanged. Unable to receive financial assistance for Copaxone injections due to undocumented status. She is presently maintained on Rebif injections and appears to be tolerating these well apart from some generalized ache 2 hours following her injections. She reports compliance with injections. PMHx/ PSHx/ FHx/ SHx:  Reviewed and unchanged previous visit. Past Medical History:   Diagnosis Date    Dysuria     Elevated lipids     Multiple sclerosis (HCC)          ROS:  Comprehensive review of systems negative except for as noted above. Objective:       Meds:  Current Outpatient Medications   Medication Sig Dispense Refill    interferon beta-1a (Rebif, with albumin,) 44 mcg/0.5 mL injection by SubCUTAneous route every Monday, Wednesday, Friday.  ibuprofen 100 mg tablet Take 100 mg by mouth every six (6) hours as needed for Pain.  acetaminophen (TylenoL) 325 mg tablet Take  by mouth every four (4) hours as needed for Pain. Exam:  Visit Vitals  /70   Pulse 92   Resp 18   SpO2 98%      NEUROLOGICAL EXAM:  General: Awake, alert, speech fluent  CN: PERRL, EOMI without nystagmus, VFF to confrontation, facial sensation and strength are normal and symmetric, hearing is intact to finger rub bilaterally, palate and tongue movements are intact and symmetric. Motor: Normal tone, bulk and strength bilaterally. Reflexes: 1/4 and symmetric, plantar stimulation is flexor. Coordination: FNF, HEATH, HTS intact. Sensation: LT intact throughout.   Gait: Normal based, stable    Labs were reviewed:  LABS  Results for orders placed or performed in visit on 12/02/20   CBC WITH AUTOMATED DIFF   Result Value Ref Range    WBC 4.5 3.4 - 10.8 x10E3/uL    RBC 4.84 3.77 - 5.28 x10E6/uL    HGB 15.2 11.1 - 15.9 g/dL    HCT 44.3 34.0 - 46.6 %    MCV 92 79 - 97 fL    MCH 31.4 26.6 - 33.0 pg    MCHC 34.3 31.5 - 35.7 g/dL    RDW 12.9 11.7 - 15.4 %    PLATELET 705 846 - 238 x10E3/uL    NEUTROPHILS 53 Not Estab. %    Lymphocytes 39 Not Estab. %    MONOCYTES 7 Not Estab. %    EOSINOPHILS 1 Not Estab. %    BASOPHILS 0 Not Estab. %    ABS. NEUTROPHILS 2.4 1.4 - 7.0 x10E3/uL    Abs Lymphocytes 1.8 0.7 - 3.1 x10E3/uL    ABS. MONOCYTES 0.3 0.1 - 0.9 x10E3/uL    ABS. EOSINOPHILS 0.0 0.0 - 0.4 x10E3/uL    ABS. BASOPHILS 0.0 0.0 - 0.2 x10E3/uL    IMMATURE GRANULOCYTES 0 Not Estab. %    ABS. IMM. GRANS. 0.0 0.0 - 0.1 T00P2/VE   METABOLIC PANEL, COMPREHENSIVE   Result Value Ref Range    Glucose 109 (H) 65 - 99 mg/dL    BUN 9 6 - 20 mg/dL    Creatinine 0.64 0.57 - 1.00 mg/dL    GFR est non- >59 mL/min/1.73    GFR est  >59 mL/min/1.73    BUN/Creatinine ratio 14 9 - 23    Sodium 141 134 - 144 mmol/L    Potassium 4.0 3.5 - 5.2 mmol/L    Chloride 105 96 - 106 mmol/L    CO2 22 20 - 29 mmol/L    Calcium 8.9 8.7 - 10.2 mg/dL    Protein, total 6.8 6.0 - 8.5 g/dL    Albumin 3.7 (L) 3.8 - 4.8 g/dL    GLOBULIN, TOTAL 3.1 1.5 - 4.5 g/dL    A-G Ratio 1.2 1.2 - 2.2    Bilirubin, total 0.3 0.0 - 1.2 mg/dL    Alk. phosphatase 100 39 - 117 IU/L    AST (SGOT) 34 0 - 40 IU/L    ALT (SGPT) 32 0 - 32 IU/L   TSH 3RD GENERATION   Result Value Ref Range    TSH 0.864 0.450 - 4.500 uIU/mL       IMAGING:  MRI Results (maximum last 3): Results from [place] encounter on 12/18/20   MRI BRAIN W WO CONT    Narrative EXAM:  MRI BRAIN W WO CONT    INDICATION:    assess for interval progression of MS lesions    COMPARISON:  MRI brain 4/18/2020. CONTRAST: 17 ml Dotarem. TECHNIQUE:    Multiplanar multisequence acquisition without and with contrast of the brain. FINDINGS:  There are multiple stable T2/FLAIR hyperintense lesions in the sasha and in the  right middle cerebellar peduncle.  A single T2/FLAIR hyperintense lesion in the  left frontal periventricular white matter is stable. There is no abnormally  restricted diffusion or enhancement associated with these lesions. No new  lesions are identified. The ventricles are normal in size and position. Incidental note of cavum vergae. There is no acute infarct, hemorrhage, extra-axial fluid collection, or mass  effect. There is no cerebellar tonsillar herniation. Expected arterial  flow-voids are present. The paranasal sinuses are clear. Trace right mastoid effusion. The orbital  contents are within normal limits. No significant osseous or scalp lesions are  identified. Impression IMPRESSION:   1. Stable infratentorial (predominantly in the sasha) and single supratentorial  white matter lesions with an appearance and pattern compatible with  demyelinating disease. No evidence of active demyelinating plaque. Results from [place] encounter on 04/18/20   MRI CERV SPINE W WO CONT    Narrative EXAM: MRI CERV SPINE W WO CONT  Clinical history: Diplopia, intranuclear ophthalmoplegia. Evaluate for  demyelination. INDICATION: h/o ANASTASIA, paresthesias eval for demyelination vs other etiology. Diplopia    COMPARISON: None    TECHNIQUE: MR imaging of the cervical spine was performed using the following  sequences: sagittal T1, T2, STIR;  axial T2, T1 prior to and following contrast  administration. CONTRAST: 18 mL of Dotarem. FINDINGS:    Increased T2 signal intensity foci in the sasha and medulla. No abnormal cord  signal intensity. Vertebral body heights are maintained. Marrow signal is  normal.    The craniocervical junction is intact. The course, caliber, and signal intensity  of the spinal cord are normal. There is no pathologic intrathecal enhancement. The paraspinal soft tissues are within normal limits. The left vertebral artery  is dominant. C2-C3: No herniation or stenosis. C3-C4: No herniation or stenosis. C4-C5: No herniation or stenosis. C5-C6: No herniation or stenosis.     C6-C7: No herniation or stenosis. C7-T1: No herniation or stenosis. Impression IMPRESSION:  Normal MRI of the cervical spine. No evidence of cervical cord demyelinating disease burden. MRI BRAIN W WO CONT    Narrative EXAM:  MRI BRAIN W WO CONT  Clinical history: Paresthesias, possible demyelination  INDICATION:    h/o ANASTASIA, paresthesias eval for demyelination vs other etiology    COMPARISON:  None. CONTRAST: 18 ml Dotarem. TECHNIQUE:    Multiplanar multisequence acquisition without and with contrast of the brain. FINDINGS:  There are minimal periventricular and scattered foci of abnormal increased T2  signal intensity noted, centrum semiovale, sasha and medulla. There is no  abnormal postcontrast enhancement to suggest active demyelination. Cavernous  sinuses are symmetric. There is no intracranial mass, hemorrhage or acute infarction. Headaches. There is no abnormal parenchymal enhancement. There is no abnormal  meningeal enhancement demonstrated. The brain architecture is normal. There is  no evidence of midline shift or mass-effect. The ventricles are normal in size,  position and configuration. There are no extra-axial fluid collections. Major  intracranial vascular flow-voids are unremarkable. The orbits are grossly  symmetric. Dural venous sinuses are grossly unremarkable. There is no Chiari or  syrinx. Pituitary and infundibulum grossly unremarkable. Cerebellopontine angles  are unremarkable. No skull base mass is identified. Cavernous sinuses are  symmetric. The mastoid air cells and are well pneumatized and clear. Impression IMPRESSION:  Minimal abnormal increased T2 signal intensity predominantly periventricular but  also noted within the sasha and pontomedullary junction, differential includes  demyelinating process. No intracranial mass, hemorrhage or evidence of acute infarction. Otherwise unremarkable MRI of the brain.            Assessment:     Encounter Diagnoses     ICD-10-CM ICD-9-CM   1. Multiple sclerosis ([facility] [address]  G35 340   2. Diplopia  H53.2 39.2   45year old Algerian speaking female here for f/u due to abnormal MRI and h/o diplopia associated with L ANASTASIA, L paresthesias during admission to [address] [place] [address]. Subsequent MRI Brain revealed b/l subcortical white matter hyperintensities with enhancement of the L pericallosal/PV white matter and sasha concerning for active demyelination vs alternative etiology. MRA H/N with patent vasculature per records. S/P LP 21 W/0R/elevated CSF MBP, 0 OCBs. Neurological examination is non-focal.  L paresthesias and ANASTASIA appear resolved s/p IVMP and subsequent steroid taper. Repeat neuroimaging with contrast 4/18/20 was reviewed showing no evidence of active demyelination, mild T2 primarily subcortical hyperintensities mainly localized to the pericallosal/PV white matter and sasha. No evidence of cervical lesions. Laboratory investigations completed without evidence of underlying systemic inflammatory/infectious or metabolic disorders which may mimic demyelinating disease. She denies MS exacerbation or new focal neurologic deficits since last visit. MRI Brain/Cervical last repeated 12/2020 and independently reviewed with overall stable MS lesion burden, no active demyelination. We reviewed the multiple sclerosis diagnosis, prognosis, and implications. We reviewed the 3-pronged treatment strategy: treating acute flares, using preventative treatments to prevent future relapses and brain lesions, and treating residual symptoms. For long-term treatment, I recommend continuation of Rebif. Discussed medication dosage, usage, goals of therapy, and side effects. Will continue periodic laboratory monitoring while on therapy. Plan:   Continue Rebif injections    Follow-up and Dispositions    · Return in about 6 months (around 9/30/2021).        I have discussed the diagnosis with the patient today and the intended plan as seen in the above orders with both the patient as well as referring provider and/or PCP via electronic correspondence. The patient has received an after-visit summary and questions were answered concerning future plans. I have discussed medication side effects and warnings with the patient as well.         Signed:  Ruben Hardy DO  3/31/2021

## 2021-04-01 ENCOUNTER — TELEPHONE (OUTPATIENT)
Dept: NEUROLOGY | Age: 41
End: 2021-04-01

## 2021-04-01 NOTE — TELEPHONE ENCOUNTER
LYNNE Oakley with the [employer] calling to see if it is okay for the patient to receive a covid vaccine. She wants to make sure there are no issues with the vaccine and her MS medications.    Her personal cell is [phone]

## 2021-04-02 NOTE — TELEPHONE ENCOUNTER
Left message for LYNNE Loving to call back.  Per -Yes, ok to proceed with vaccination from a neurologic perspective

## 2021-05-28 NOTE — TELEPHONE ENCOUNTER
Spoke with Meghna with [employer], informed her of situation. She states there are no further programs that her and her manager are aware of to help get patient assistance to get Copaxone or generic medication. done

## 2021-09-07 NOTE — TELEPHONE ENCOUNTER
----- Message from Dian Eng sent at 9/7/2021 10:01 AM EDT -----  Regarding: Dr. Santy Cramona (if not patient): Care manager       Relationship of caller (if not patient):  Fany Guevara contact number(s): [phone]    Name of medication and dosage if known: interferon beta-1a (Rebif, with albumin,) 44 mcg/0.5 mL injection [[id number]]      Is patient out of this medication (yes/no): yes      Ph[facility]    Pharmacy listed in chart? (yes/no): not sure   Pharmacy phone number: [phone]      Details to clarify the request: patient needs to refill      Lore Caden

## 2021-09-08 RX ORDER — INTERFERON BETA-1A 44 UG/.5ML
44 INJECTION, SOLUTION SUBCUTANEOUS
Qty: 15 EACH | Refills: 5 | Status: SHIPPED | OUTPATIENT
Start: 2021-09-08 | End: 2022-10-14 | Stop reason: SDUPTHER

## 2021-10-28 ENCOUNTER — TELEPHONE (OUTPATIENT)
Dept: NEUROLOGY | Age: 41
End: 2021-10-28

## 2021-10-28 NOTE — TELEPHONE ENCOUNTER
Re: Rebif    See pt used PAP program last year to get rebif since they are uninsured. Pt was approved from 10/19/2020-10/18/2021 and new request must be submitted. Sent message to nurse to complete rebif form.

## 2021-11-19 ENCOUNTER — TELEPHONE (OUTPATIENT)
Dept: NEUROLOGY | Age: 41
End: 2021-11-19

## 2021-11-19 NOTE — TELEPHONE ENCOUNTER
Re: rebif    Rcvd scripts back signed by provider, I dont think it was a start form but I faced out script to [facility]      Nurse might need to submit a new start form.

## 2022-04-05 ENCOUNTER — TELEPHONE (OUTPATIENT)
Dept: NEUROLOGY | Age: 42
End: 2022-04-05

## 2022-04-05 NOTE — TELEPHONE ENCOUNTER
She would like to know when is her next MRI and CT Scan. Please call. Gretel Cornelius She was in the hospital for over a month and they found out she has MS.

## 2022-04-19 ENCOUNTER — OFFICE VISIT (OUTPATIENT)
Dept: NEUROLOGY | Age: 42
End: 2022-04-19
Payer: SUBSIDIZED

## 2022-04-19 VITALS
OXYGEN SATURATION: 98 % | RESPIRATION RATE: 18 BRPM | SYSTOLIC BLOOD PRESSURE: 126 MMHG | HEART RATE: 79 BPM | DIASTOLIC BLOOD PRESSURE: 80 MMHG

## 2022-04-19 DIAGNOSIS — R29.898 COMPLAINTS OF WEAKNESS OF LOWER EXTREMITY: ICD-10-CM

## 2022-04-19 DIAGNOSIS — G35 MULTIPLE SCLEROSIS (HCC): Primary | ICD-10-CM

## 2022-04-19 DIAGNOSIS — G35 MULTIPLE SCLEROSIS (HCC): ICD-10-CM

## 2022-04-19 LAB
ALBUMIN SERPL-MCNC: 3.3 G/DL (ref 3.5–5)
ALBUMIN/GLOB SERPL: 0.9 {RATIO} (ref 1.1–2.2)
ALP SERPL-CCNC: 121 U/L (ref 45–117)
ALT SERPL-CCNC: 52 U/L (ref 12–78)
ANION GAP SERPL CALC-SCNC: 5 MMOL/L (ref 5–15)
AST SERPL-CCNC: 42 U/L (ref 15–37)
BASOPHILS # BLD: 0 K/UL (ref 0–0.1)
BASOPHILS NFR BLD: 1 % (ref 0–1)
BILIRUB SERPL-MCNC: 0.2 MG/DL (ref 0.2–1)
BUN SERPL-MCNC: 7 MG/DL (ref 6–20)
BUN/CREAT SERPL: 12 (ref 12–20)
CALCIUM SERPL-MCNC: 7.9 MG/DL (ref 8.5–10.1)
CHLORIDE SERPL-SCNC: 107 MMOL/L (ref 97–108)
CO2 SERPL-SCNC: 25 MMOL/L (ref 21–32)
CREAT SERPL-MCNC: 0.59 MG/DL (ref 0.55–1.02)
DIFFERENTIAL METHOD BLD: ABNORMAL
EOSINOPHIL # BLD: 0.2 K/UL (ref 0–0.4)
EOSINOPHIL NFR BLD: 2 % (ref 0–7)
ERYTHROCYTE [DISTWIDTH] IN BLOOD BY AUTOMATED COUNT: 13.7 % (ref 11.5–14.5)
GLOBULIN SER CALC-MCNC: 3.8 G/DL (ref 2–4)
GLUCOSE SERPL-MCNC: 71 MG/DL (ref 65–100)
HCT VFR BLD AUTO: 44.1 % (ref 35–47)
HGB BLD-MCNC: 14.2 G/DL (ref 11.5–16)
IMM GRANULOCYTES # BLD AUTO: 0 K/UL (ref 0–0.04)
IMM GRANULOCYTES NFR BLD AUTO: 1 % (ref 0–0.5)
LYMPHOCYTES # BLD: 1.9 K/UL (ref 0.8–3.5)
LYMPHOCYTES NFR BLD: 30 % (ref 12–49)
MCH RBC QN AUTO: 31.7 PG (ref 26–34)
MCHC RBC AUTO-ENTMCNC: 32.2 G/DL (ref 30–36.5)
MCV RBC AUTO: 98.4 FL (ref 80–99)
MONOCYTES # BLD: 0.7 K/UL (ref 0–1)
MONOCYTES NFR BLD: 11 % (ref 5–13)
NEUTS SEG # BLD: 3.7 K/UL (ref 1.8–8)
NEUTS SEG NFR BLD: 55 % (ref 32–75)
NRBC # BLD: 0 K/UL (ref 0–0.01)
NRBC BLD-RTO: 0 PER 100 WBC
PLATELET # BLD AUTO: 209 K/UL (ref 150–400)
PMV BLD AUTO: 11.7 FL (ref 8.9–12.9)
POTASSIUM SERPL-SCNC: 4.2 MMOL/L (ref 3.5–5.1)
PROT SERPL-MCNC: 7.1 G/DL (ref 6.4–8.2)
RBC # BLD AUTO: 4.48 M/UL (ref 3.8–5.2)
SODIUM SERPL-SCNC: 137 MMOL/L (ref 136–145)
TSH SERPL DL<=0.05 MIU/L-ACNC: 1.34 UIU/ML (ref 0.36–3.74)
WBC # BLD AUTO: 6.5 K/UL (ref 3.6–11)

## 2022-04-19 RX ORDER — ALBUTEROL SULFATE 90 UG/1
AEROSOL, METERED RESPIRATORY (INHALATION)
COMMUNITY
Start: 2022-03-21

## 2022-04-19 NOTE — PATIENT INSTRUCTIONS
10 [facility]   Statement to Patients  April 1, 2014      In an effort to ensure the large volume of patient prescription refills is processed in the most efficient and expeditious manner, we are asking our patients to assist us by calling your Pharmacy for all prescription refills, this will include also your  Mail Order Pharmacy. The pharmacy will contact our office electronically to continue the refill process. Please do not wait until the last minute to call your pharmacy. We need at least 48 hours (2days) to fill prescriptions. We also encourage you to call your pharmacy before going to  your prescription to make sure it is ready. With regard to controlled substance prescription refill requests (narcotic refills) that need to be picked up at our office, we ask your cooperation by providing us with at least 72 hours (3days) notice that you will need a refill. We will not refill narcotic prescription refill requests after 4:00pm on any weekday, Monday through Thursday, or after 2:00pm on Fridays, or on the weekends. We encourage everyone to explore another way of getting your prescription refill request processed using App[employer]ise, our patient web portal through our electronic medical record system. App[employer]ise is an efficient and effective way to communicate your medication request directly to the office and  downloadable as an shila on your smart phone . [employer]ise also features a review functionality that allows you to view your medication list as well as leave messages for your physician. Are you ready to get connected? If so please review the attatched instructions or speak to any of our staff to get you set up right away! Thank you so much for your cooperation. Should you have any questions please contact our Practice Administrator.     The Physicians and Staff,  [facility]

## 2022-04-19 NOTE — PROGRESS NOTES
Neurology Clinic Follow up Note    Patient ID:  Niels Delacruz  [id number]  37 y.o.  1980      Ms. Kimmy Breen is here for follow up today of  Chief Complaint   Patient presents with    Follow-up     MS     Previous records (physician notes, laboratory reports, and radiology reports) and imaging studies were reviewed and summarized. My recommendations will be communicated back to the patient's physician(s) via electronic medical record and/or by [address] [facility] [facility] mail. Last Appointment With Me:  3/31/2021    \"37 y.o.  female presenting for evaluation of MS. Interview is conducted with the assistance of a Polish phone . She presented with L intranuclear ophthalmoplegia with recent hospitalization at [address] [place] [address] per OSH records with associated paresthesias of the L face/LUE/LLE and diplopia. She completed IVMP 1g x 3 doses with subsequent steroid taper. MRI Brain reportedly showed b/l subcortical white matter hyperintensities with enhancement of the L pericallosal/PV white matter and sasha concerning for demyelination vs alternative etiologies not limited to vasculitis, less likely infectious, lymphoproliferative process. MRA H/N with patent vasculature per records. S/P LP 21 W/0R/elevated CSF MBP  She reports new right sided paresthesias, intermittent, since her discharge from [address] [place] [address] involving her RLE. Medical access has been limited due to lack of insurance. Left paresthesias are resolved. She notes weakness generalized but worse into the lower extremities. She is able to walk without significant imbalance or falls. She reports resolution of diplopia. She does note blurred vision b/l. \"   Interval History:    assist with translation today. She reports weakness into both legs proximally, especially when in the shower x3 months. She also endorses some intrinsic hand weakness over the same time period. She admits to occasional cervicalgia.   Denies paresthesias, numbness, vision loss. +Intermittent gait instability, no falls. She admits to some constipation but no urinary/bowel incontinence. She also reports some cognitive/recall deficits over the past several months. She is presently maintained on Rebif injections and appears to be tolerating these well. She reports compliance with injections. PMHx/ PSHx/ FHx/ SHx:  Reviewed and unchanged previous visit. Past Medical History:   Diagnosis Date    Dysuria     Elevated lipids     Multiple sclerosis (HCC)          ROS:  Comprehensive review of systems negative except for as noted above. Objective:       Meds:  Current Outpatient Medications   Medication Sig Dispense Refill    albuterol (PROVENTIL HFA, VENTOLIN HFA, PROAIR HFA) 90 mcg/actuation inhaler INHALE 2 PUFFS BY MOUTH EVERY 4 TO 6 HOURS AS NEEDED      interferon beta-1a (Rebif, with albumin,) 44 mcg/0.5 mL injection 0.5 mL by SubCUTAneous route every Monday, Wednesday, Friday. 15 Each 5    ibuprofen 100 mg tablet Take 100 mg by mouth every six (6) hours as needed for Pain.  acetaminophen (TylenoL) 325 mg tablet Take  by mouth every four (4) hours as needed for Pain. Exam:  Visit Vitals  Pulse 79   Resp 18   SpO2 98%      NEUROLOGICAL EXAM:  General: Awake, alert, speech fluent  CN: PERRL, EOMI without nystagmus, VFF to confrontation, facial sensation and strength are normal and symmetric, hearing is intact to finger rub bilaterally, palate and tongue movements are intact and symmetric. Motor: Normal tone, bulk and strength bilaterally. Reflexes: 2/4 and symmetric, plantar stimulation is flexor. Coordination: FNF, HEATH, HTS intact. Sensation: LT intact throughout.   Gait: Normal based, stable    Labs were reviewed:  LABS  Results for orders placed or performed in visit on 12/02/20   CBC WITH AUTOMATED DIFF   Result Value Ref Range    WBC 4.5 3.4 - 10.8 x10E3/uL    RBC 4.84 3.77 - 5.28 x10E6/uL    HGB 15.2 11.1 - 15.9 g/dL    HCT 44.3 34.0 - 46.6 %    MCV 92 79 - 97 fL    MCH 31.4 26.6 - 33.0 pg    MCHC 34.3 31.5 - 35.7 g/dL    RDW 12.9 11.7 - 15.4 %    PLATELET 750 417 - 961 x10E3/uL    NEUTROPHILS 53 Not Estab. %    Lymphocytes 39 Not Estab. %    MONOCYTES 7 Not Estab. %    EOSINOPHILS 1 Not Estab. %    BASOPHILS 0 Not Estab. %    ABS. NEUTROPHILS 2.4 1.4 - 7.0 x10E3/uL    Abs Lymphocytes 1.8 0.7 - 3.1 x10E3/uL    ABS. MONOCYTES 0.3 0.1 - 0.9 x10E3/uL    ABS. EOSINOPHILS 0.0 0.0 - 0.4 x10E3/uL    ABS. BASOPHILS 0.0 0.0 - 0.2 x10E3/uL    IMMATURE GRANULOCYTES 0 Not Estab. %    ABS. IMM. GRANS. 0.0 0.0 - 0.1 [id number]   METABOLIC PANEL, COMPREHENSIVE   Result Value Ref Range    Glucose 109 (H) 65 - 99 mg/dL    BUN 9 6 - 20 mg/dL    Creatinine 0.64 0.57 - 1.00 mg/dL    GFR est non- >59 mL/min/1.73    GFR est  >59 mL/min/1.73    BUN/Creatinine ratio 14 9 - 23    Sodium 141 134 - 144 mmol/L    Potassium 4.0 3.5 - 5.2 mmol/L    Chloride 105 96 - 106 mmol/L    CO2 22 20 - 29 mmol/L    Calcium 8.9 8.7 - 10.2 mg/dL    Protein, total 6.8 6.0 - 8.5 g/dL    Albumin 3.7 (L) 3.8 - 4.8 g/dL    GLOBULIN, TOTAL 3.1 1.5 - 4.5 g/dL    A-G Ratio 1.2 1.2 - 2.2    Bilirubin, total 0.3 0.0 - 1.2 mg/dL    Alk. phosphatase 100 39 - 117 IU/L    AST (SGOT) 34 0 - 40 IU/L    ALT (SGPT) 32 0 - 32 IU/L   TSH 3RD GENERATION   Result Value Ref Range    TSH 0.864 0.450 - 4.500 uIU/mL       IMAGING:  MRI Results (maximum last 3): Results from [place] encounter on 12/18/20    MRI BRAIN W WO CONT    Narrative  EXAM:  MRI BRAIN W WO CONT    INDICATION:    assess for interval progression of MS lesions    COMPARISON:  MRI brain 4/18/2020. CONTRAST: 17 ml Dotarem. TECHNIQUE:  Multiplanar multisequence acquisition without and with contrast of the brain. FINDINGS:  There are multiple stable T2/FLAIR hyperintense lesions in the sasha and in the  right middle cerebellar peduncle.  A single T2/FLAIR hyperintense lesion in the  left frontal periventricular white matter is stable. There is no abnormally  restricted diffusion or enhancement associated with these lesions. No new  lesions are identified. The ventricles are normal in size and position. Incidental note of cavum vergae. There is no acute infarct, hemorrhage, extra-axial fluid collection, or mass  effect. There is no cerebellar tonsillar herniation. Expected arterial  flow-voids are present. The paranasal sinuses are clear. Trace right mastoid effusion. The orbital  contents are within normal limits. No significant osseous or scalp lesions are  identified. Impression  IMPRESSION:  1. Stable infratentorial (predominantly in the sasha) and single supratentorial  white matter lesions with an appearance and pattern compatible with  demyelinating disease. No evidence of active demyelinating plaque. Results from [place] encounter on 04/18/20    MRI CERV SPINE W WO CONT    Narrative  EXAM: MRI CERV SPINE W WO CONT  Clinical history: Diplopia, intranuclear ophthalmoplegia. Evaluate for  demyelination. INDICATION: h/o ANASTASIA, paresthesias eval for demyelination vs other etiology. Diplopia    COMPARISON: None    TECHNIQUE: MR imaging of the cervical spine was performed using the following  sequences: sagittal T1, T2, STIR;  axial T2, T1 prior to and following contrast  administration. CONTRAST: 18 mL of Dotarem. FINDINGS:    Increased T2 signal intensity foci in the sasha and medulla. No abnormal cord  signal intensity. Vertebral body heights are maintained. Marrow signal is  normal.    The craniocervical junction is intact. The course, caliber, and signal intensity  of the spinal cord are normal. There is no pathologic intrathecal enhancement. The paraspinal soft tissues are within normal limits. The left vertebral artery  is dominant. C2-C3: No herniation or stenosis. C3-C4: No herniation or stenosis. C4-C5: No herniation or stenosis. C5-C6: No herniation or stenosis.     C6-C7: No herniation or stenosis. C7-T1: No herniation or stenosis. Impression  IMPRESSION:  Normal MRI of the cervical spine. No evidence of cervical cord demyelinating disease burden. MRI BRAIN W WO CONT    Narrative  EXAM:  MRI BRAIN W WO CONT  Clinical history: Paresthesias, possible demyelination  INDICATION:    h/o ANASTASIA, paresthesias eval for demyelination vs other etiology    COMPARISON:  None. CONTRAST: 18 ml Dotarem. TECHNIQUE:  Multiplanar multisequence acquisition without and with contrast of the brain. FINDINGS:  There are minimal periventricular and scattered foci of abnormal increased T2  signal intensity noted, centrum semiovale, sasha and medulla. There is no  abnormal postcontrast enhancement to suggest active demyelination. Cavernous  sinuses are symmetric. There is no intracranial mass, hemorrhage or acute infarction. Headaches. There is no abnormal parenchymal enhancement. There is no abnormal  meningeal enhancement demonstrated. The brain architecture is normal. There is  no evidence of midline shift or mass-effect. The ventricles are normal in size,  position and configuration. There are no extra-axial fluid collections. Major  intracranial vascular flow-voids are unremarkable. The orbits are grossly  symmetric. Dural venous sinuses are grossly unremarkable. There is no Chiari or  syrinx. Pituitary and infundibulum grossly unremarkable. Cerebellopontine angles  are unremarkable. No skull base mass is identified. Cavernous sinuses are  symmetric. The mastoid air cells and are well pneumatized and clear. Impression  IMPRESSION:  Minimal abnormal increased T2 signal intensity predominantly periventricular but  also noted within the sasha and pontomedullary junction, differential includes  demyelinating process. No intracranial mass, hemorrhage or evidence of acute infarction. Otherwise unremarkable MRI of the brain.       Assessment:     Encounter Diagnoses     ICD-10-CM ICD-9-CM 1. Multiple sclerosis ([facility] [address]  G35 340   2. Complaints of weakness of lower extremity  R29.898 18.80   37year old Estonian speaking female here for f/u due to abnormal MRI and h/o diplopia associated with L ANASTASIA, L paresthesias during admission to [address] [place] [address]. Subsequent MRI Brain revealed b/l subcortical white matter hyperintensities with enhancement of the L pericallosal/PV white matter and sasha concerning for active demyelination vs alternative etiology. MRA H/N with patent vasculature per records. S/P LP 21 W/0R/elevated CSF MBP, 0 OCBs. Neurological examination is non-focal.  L paresthesias and ANASTASIA appear resolved s/p IVMP and subsequent steroid taper. Neuroimaging with contrast 4/18/20 was reviewed showing no evidence of active demyelination, mild T2 primarily subcortical hyperintensities mainly localized to the pericallosal/PV white matter and sasha. No evidence of cervical lesions. MRI Brain/Cervical last repeated 12/2020 and independently reviewed with overall stable MS lesion burden, no active demyelination. Laboratory investigations were completed without evidence of underlying systemic inflammatory/infectious or metabolic disorders which may mimic demyelinating disease. She has been lost to f/u for over 1 year and reports some newer onset lower extremity and distal hand weakness. Examination remains non-focal. Will obtain repeat neuroimaging to assess for any interval progression of MS lesions and exclude active demyelination. We reviewed the multiple sclerosis diagnosis, prognosis, and implications. We reviewed the 3-pronged treatment strategy: treating acute flares, using preventative treatments to prevent future relapses and brain lesions, and treating residual symptoms. For long-term treatment, I recommend continuation of Rebif. Discussed medication dosage, usage, goals of therapy, and side effects. Will continue periodic laboratory monitoring while on therapy.    Plan:   Repeat MRI Brain/Cervical WWO  Obtain CBC, CMP, TSH  Continue Rebif injections     Follow-up and Dispositions    · Return in about 6 months (around 10/19/2022). I have discussed the diagnosis with the patient today and the intended plan as seen in the above orders with both the patient as well as referring provider and/or PCP via electronic correspondence. The patient has received an after-visit summary and questions were answered concerning future plans. I have discussed medication side effects and warnings with the patient as well.         Signed:  Nola Thomas DO  4/19/2022

## 2022-04-22 ENCOUNTER — TELEPHONE (OUTPATIENT)
Dept: NEUROLOGY | Age: 42
End: 2022-04-22

## 2022-04-22 DIAGNOSIS — F40.240 CLAUSTROPHOBIA: Primary | ICD-10-CM

## 2022-04-22 RX ORDER — LORAZEPAM 0.5 MG/1
TABLET ORAL
Qty: 2 TABLET | Refills: 0 | Status: SHIPPED | OUTPATIENT
Start: 2022-04-22 | End: 2022-10-24

## 2022-04-22 NOTE — TELEPHONE ENCOUNTER
Canceled Ativan prescription for mail order, called in to local [address] [place] [address]

## 2022-04-22 NOTE — TELEPHONE ENCOUNTER
Patient has not received her medication from the pharmacy yet and she has her MRI on this coming Monday. Please call.

## 2022-04-22 NOTE — TELEPHONE ENCOUNTER
Spoke with Vinny Layne, she states patient will need something for anxiety prior to MRI. Requesting Ativan.

## 2022-04-25 ENCOUNTER — HOSPITAL ENCOUNTER (OUTPATIENT)
Dept: MRI IMAGING | Age: 42
Discharge: HOME | End: 2022-04-25
Attending: PSYCHIATRY & NEUROLOGY

## 2022-04-25 DIAGNOSIS — R29.898 COMPLAINTS OF WEAKNESS OF LOWER EXTREMITY: ICD-10-CM

## 2022-04-25 DIAGNOSIS — G35 MULTIPLE SCLEROSIS (HCC): ICD-10-CM

## 2022-04-25 PROCEDURE — 70553 MRI BRAIN STEM W/O & W/DYE: CPT

## 2022-04-25 PROCEDURE — A9576 INJ PROHANCE MULTIPACK: HCPCS | Performed by: PSYCHIATRY & NEUROLOGY

## 2022-04-25 PROCEDURE — 72156 MRI NECK SPINE W/O & W/DYE: CPT

## 2022-04-25 PROCEDURE — 74011250636 HC RX REV CODE- 250/636: Performed by: PSYCHIATRY & NEUROLOGY

## 2022-04-25 RX ADMIN — GADOTERIDOL 20 ML: 279.3 INJECTION, SOLUTION INTRAVENOUS at 19:01

## 2022-10-12 DIAGNOSIS — F40.240 CLAUSTROPHOBIA: ICD-10-CM

## 2022-10-12 RX ORDER — LORAZEPAM 0.5 MG/1
TABLET ORAL
Qty: 2 TABLET | Refills: 0 | Status: CANCELLED | OUTPATIENT
Start: 2022-10-12

## 2022-10-12 NOTE — TELEPHONE ENCOUNTER
Requested Prescriptions     Pending Prescriptions Disp Refills    LORazepam (ATIVAN) 0.5 mg tablet 2 Tablet 0     Sig: Take 0.5mg 30 min prior to MRI. May repeat once if needed. Max dose 1mg. Pt req refill.  States she has gone 2 wks without medication

## 2022-10-17 RX ORDER — INTERFERON BETA-1A 44 UG/.5ML
44 INJECTION, SOLUTION SUBCUTANEOUS
Qty: 15 EACH | Refills: 5 | Status: SHIPPED | OUTPATIENT
Start: 2022-10-17

## 2022-10-24 ENCOUNTER — OFFICE VISIT (OUTPATIENT)
Dept: NEUROLOGY | Age: 42
End: 2022-10-24

## 2022-10-24 VITALS
HEART RATE: 72 BPM | DIASTOLIC BLOOD PRESSURE: 72 MMHG | OXYGEN SATURATION: 98 % | BODY MASS INDEX: 35.33 KG/M2 | SYSTOLIC BLOOD PRESSURE: 128 MMHG | WEIGHT: 192 LBS | HEIGHT: 62 IN

## 2022-10-24 DIAGNOSIS — R20.2 PARESTHESIA OF BOTH HANDS: ICD-10-CM

## 2022-10-24 DIAGNOSIS — G35 MULTIPLE SCLEROSIS (HCC): Primary | ICD-10-CM

## 2022-10-24 PROCEDURE — 99214 OFFICE O/P EST MOD 30 MIN: CPT | Performed by: PSYCHIATRY & NEUROLOGY

## 2022-10-24 NOTE — PROGRESS NOTES
Neurology Clinic Follow up Note    Patient ID:  Deepa Bryant  [id number]  40 y.o.  1980      Ms. Darnell Erickson is here for follow up today of MS. Previous records (physician notes, laboratory reports, and radiology reports) and imaging studies were reviewed and summarized. My recommendations will be communicated back to the patient's physician(s) via electronic medical record and/or by [address] mail. Last Appointment With Me:  4/19/2022    \"Patient presenting for evaluation of MS. Interview is conducted with the assistance of a Irish phone . She presented with L intranuclear ophthalmoplegia with recent hospitalization at [facility] per OSH records with associated paresthesias of the L face/LUE/LLE and diplopia. She completed IVMP 1g x 3 doses with subsequent steroid taper. MRI Brain reportedly showed b/l subcortical white matter hyperintensities with enhancement of the L pericallosal/PV white matter and sahsa concerning for demyelination vs alternative etiologies not limited to vasculitis, less likely infectious, lymphoproliferative process. MRA H/N with patent vasculature per records. S/P LP 21 W/0R/elevated CSF MBP  She reports new right sided paresthesias, intermittent, since her discharge from [facility] involving her RLE. Medical access has been limited due to lack of insurance. Left paresthesias are resolved. She notes weakness generalized but worse into the lower extremities. She is able to walk without significant imbalance or falls. She reports resolution of diplopia. She does note blurred vision b/l. \"   Interval History:    assist with translation today. She states she is doing well. Taking Rebif as prescribed and tolerating injections well apart from some hot flashes which appear transient. Denies new focal weakness, vision loss. She notes paresthesias intermittently into her hands. Denies gait instability/falls. No urinary/bowel incontinence. Reports mild occasional cognitive impairment. She is currently employed as a . PMHx/ PSHx/ FHx/ SHx:  Reviewed and unchanged previous visit. Past Medical History:   Diagnosis Date    Dysuria     Elevated lipids     Multiple sclerosis (HCC)          ROS:  Comprehensive review of systems negative except for as noted above. Objective:       Meds:  Current Outpatient Medications   Medication Sig Dispense Refill    psyllium (METAMUCIL) powd Take  by mouth. interferon beta-1a (Rebif, with albumin,) 44 mcg/0.5 mL injection 0.5 mL by SubCUTAneous route every Monday, Wednesday, Friday. 15 Each 5    albuterol (PROVENTIL HFA, VENTOLIN HFA, PROAIR HFA) 90 mcg/actuation inhaler INHALE 2 PUFFS BY MOUTH EVERY 4 TO 6 HOURS AS NEEDED      ibuprofen 100 mg tablet Take 100 mg by mouth every six (6) hours as needed for Pain. acetaminophen (TYLENOL) 325 mg tablet Take  by mouth every four (4) hours as needed for Pain. Exam:  Visit Vitals  /72   Pulse 72   Ht 5' 2\" (1.575 m)   Wt 192 lb (87.1 kg)   SpO2 98%   BMI 35.12 kg/m²     NEUROLOGICAL EXAM:  General: Awake, alert, speech fluent  CN: PERRL, EOMI without nystagmus, VFF to confrontation, facial sensation and strength are normal and symmetric, hearing is intact to finger rub bilaterally, palate and tongue movements are intact and symmetric. Motor: Normal tone, bulk and strength bilaterally. Reflexes: 2/4 and symmetric, plantar stimulation is flexor. Coordination: FNF, HEATH, HTS intact. Sensation: LT intact throughout.   Gait: Normal based, stable    Labs were reviewed:  LABS  Results for orders placed or performed in visit on 66/23/44   METABOLIC PANEL, COMPREHENSIVE   Result Value Ref Range    Sodium 137 136 - 145 mmol/L    Potassium 4.2 3.5 - 5.1 mmol/L    Chloride 107 97 - 108 mmol/L    CO2 25 21 - 32 mmol/L    Anion gap 5 5 - 15 mmol/L    Glucose 71 65 - 100 mg/dL    BUN 7 6 - 20 MG/DL    Creatinine 0.59 0.55 - 1.02 MG/DL BUN/Creatinine ratio 12 12 - 20      GFR est AA >60 >60 ml/min/1.73m2    GFR est non-AA >60 >60 ml/min/1.73m2    Calcium 7.9 (L) 8.5 - 10.1 MG/DL    Bilirubin, total 0.2 0.2 - 1.0 MG/DL    ALT (SGPT) 52 12 - 78 U/L    AST (SGOT) 42 (H) 15 - 37 U/L    Alk. phosphatase 121 (H) 45 - 117 U/L    Protein, total 7.1 6.4 - 8.2 g/dL    Albumin 3.3 (L) 3.5 - 5.0 g/dL    Globulin 3.8 2.0 - 4.0 g/dL    A-G Ratio 0.9 (L) 1.1 - 2.2     CBC WITH AUTOMATED DIFF   Result Value Ref Range    WBC 6.5 3.6 - 11.0 K/uL    RBC 4.48 3.80 - 5.20 M/uL    HGB 14.2 11.5 - 16.0 g/dL    HCT 44.1 35.0 - 47.0 %    MCV 98.4 80.0 - 99.0 FL    MCH 31.7 26.0 - 34.0 PG    MCHC 32.2 30.0 - 36.5 g/dL    RDW 13.7 11.5 - 14.5 %    PLATELET 961 255 - 093 K/uL    MPV 11.7 8.9 - 12.9 FL    NRBC 0.0 0  WBC    ABSOLUTE NRBC 0.00 0.00 - 0.01 K/uL    NEUTROPHILS 55 32 - 75 %    LYMPHOCYTES 30 12 - 49 %    MONOCYTES 11 5 - 13 %    EOSINOPHILS 2 0 - 7 %    BASOPHILS 1 0 - 1 %    IMMATURE GRANULOCYTES 1 (H) 0.0 - 0.5 %    ABS. NEUTROPHILS 3.7 1.8 - 8.0 K/UL    ABS. LYMPHOCYTES 1.9 0.8 - 3.5 K/UL    ABS. MONOCYTES 0.7 0.0 - 1.0 K/UL    ABS. EOSINOPHILS 0.2 0.0 - 0.4 K/UL    ABS. BASOPHILS 0.0 0.0 - 0.1 K/UL    ABS. IMM. GRANS. 0.0 0.00 - 0.04 K/UL    DF AUTOMATED     TSH 3RD GENERATION   Result Value Ref Range    TSH 1.34 0.36 - 3.74 uIU/mL       IMAGING:  MRI Results (maximum last 3): Results from [place] encounter on 04/25/22    MRI CERV SPINE W WO CONT    Narrative  EXAM: MRI CERV SPINE W WO CONT    INDICATION: Lower extremity weakness, eval for interval progression of MS  lesions. Multiple sclerosis    COMPARISON: 4.18.2020    TECHNIQUE: MR imaging of the cervical spine was performed using the following  sequences: sagittal T1, T2, STIR;  axial T2, T1 prior to and following contrast  administration. CONTRAST: 20 mL of ProHance. FINDINGS:    There is normal alignment of the cervical spine. Vertebral body heights are  maintained.  Marrow signal is normal.    The craniocervical junction is intact. The course, caliber, and signal intensity  of the spinal cord are normal.    There is no pathologic intrathecal enhancement. The paraspinal soft tissues are within normal limits. C2-C3: No herniation or stenosis. C3-C4: No herniation or stenosis. C4-C5: No herniation or stenosis. C5-C6: No herniation or stenosis. Mild disc bulge. C6-C7: No herniation or stenosis. Mild disc bulge. C7-T1: No herniation or stenosis. Impression  Normal study for age. No evidence for active multiple sclerosis of the cervical spine. MRI BRAIN W WO CONT    Narrative  Clinical history: LE weakness, eval for interval progression of MS lesions  INDICATION:   LE weakness, eval for interval progression of MS lesions    COMPARISON: 12/18/2020  TECHNIQUE: MR examination of the brain includes axial and sagittal T1 , axial  T2, axial FLAIR, axial gradient echo, axial DWI, coronal T1 . Pre and post  contrast axial T1-weighted imaging. Postcontrast T1-weighted imaging coronal  plane. CONTRAST: 20 ml ProHance  FINDINGS:  There is no intracranial mass, hemorrhage or acute infarction. IACs are symmetric in size. Cavum septum is incidentally noted. Trace pontine, right sided FLAIR signal abnormality and solitary focus of FLAIR  hyperintensity in the periventricular white matter of the left frontal lobe  stable. There is no abnormal parenchymal enhancement. There is no abnormal meningeal  enhancement demonstrated. The brain architecture is normal. There is no evidence  of midline shift or mass-effect. The ventricles are normal in size, position and  configuration. There are no extra-axial fluid collections. Major intracranial  vascular flow-voids are unremarkable. The orbits are grossly symmetric. Dural  venous sinuses are grossly unremarkable. There is no Chiari or syrinx. Pituitary  and infundibulum grossly unremarkable.  Cerebellopontine angles are unremarkable. No skull base mass is identified. Cavernous sinuses are symmetric. The mastoid  air cells and are well pneumatized and clear. Impression  Stable minimal trace infratentorial and supratentorial demyelinating disease  burden. No evidence of active demyelination. No evidence of interval  progression. No intracranial mass, hemorrhage or evidence of acute infarction. Results from [place] encounter on 12/18/20    MRI BRAIN W WO CONT    Narrative  EXAM:  MRI BRAIN W WO CONT    INDICATION:    assess for interval progression of MS lesions    COMPARISON:  MRI brain 4/18/2020. CONTRAST: 17 ml Dotarem. TECHNIQUE:  Multiplanar multisequence acquisition without and with contrast of the brain. FINDINGS:  There are multiple stable T2/FLAIR hyperintense lesions in the sasha and in the  right middle cerebellar peduncle. A single T2/FLAIR hyperintense lesion in the  left frontal periventricular white matter is stable. There is no abnormally  restricted diffusion or enhancement associated with these lesions. No new  lesions are identified. The ventricles are normal in size and position. Incidental note of cavum vergae. There is no acute infarct, hemorrhage, extra-axial fluid collection, or mass  effect. There is no cerebellar tonsillar herniation. Expected arterial  flow-voids are present. The paranasal sinuses are clear. Trace right mastoid effusion. The orbital  contents are within normal limits. No significant osseous or scalp lesions are  identified. Impression  IMPRESSION:  1. Stable infratentorial (predominantly in the sasha) and single supratentorial  white matter lesions with an appearance and pattern compatible with  demyelinating disease. No evidence of active demyelinating plaque. Assessment:     Encounter Diagnoses     ICD-10-CM ICD-9-CM   1. Multiple sclerosis ([facility] [address]  G35 340   2.  Paresthesia of both hands  R20.2 66.0   40year old Arabic speaking female here for f/u due to abnormal MRI and h/o diplopia associated with L ANASTASIA, L paresthesias during admission to [facility]. Subsequent MRI Brain revealed b/l subcortical white matter hyperintensities with enhancement of the L pericallosal/PV white matter and sasha concerning for active demyelination vs alternative etiology. MRA H/N with patent vasculature per records. S/P LP 21 W/0R/elevated CSF MBP, 0 OCBs. Neurological examination is non-focal.  L paresthesias and ANASTASIA appear resolved s/p IVMP and subsequent steroid taper. Neuroimaging with contrast 4/18/20 was reviewed showing no evidence of active demyelination, mild T2 primarily subcortical hyperintensities mainly localized to the pericallosal/PV white matter and ssaha. No evidence of cervical lesions. MRI Brain/Cervical last repeated 4/2022 and independently reviewed with overall stable MS lesion burden, no active demyelination. Laboratory investigations were completed without evidence of underlying systemic inflammatory/infectious or metabolic disorders which may mimic demyelinating disease. We reviewed the multiple sclerosis diagnosis, prognosis, and implications. We reviewed the 3-pronged treatment strategy: treating acute flares, using preventative treatments to prevent future relapses and brain lesions, and treating residual symptoms. For long-term treatment, I recommend continuation of Rebif. Discussed medication dosage, usage, goals of therapy, and side effects. Will continue periodic laboratory monitoring while on therapy. Plan:   Obtain CBC, CMP, TSH  Continue Rebif injections  EMG b/l UE to evaluate b/l hand paresthesias    Follow-up and Dispositions    Return in about 6 months (around 4/24/2023). I have discussed the diagnosis with the patient today and the intended plan as seen in the above orders with both the patient as well as referring provider and/or PCP via electronic correspondence.  The patient has received an after-visit summary and questions were answered concerning future plans. I have discussed medication side effects and warnings with the patient as well.         Signed:  Mirna Blue DO  10/24/2022

## 2022-10-25 LAB
ALBUMIN SERPL-MCNC: 3.8 G/DL (ref 3.8–4.8)
ALBUMIN/GLOB SERPL: 1.3 {RATIO} (ref 1.2–2.2)
ALP SERPL-CCNC: 97 IU/L (ref 44–121)
ALT SERPL-CCNC: 33 IU/L (ref 0–32)
AST SERPL-CCNC: 27 IU/L (ref 0–40)
BASOPHILS # BLD AUTO: 0 X10E3/UL (ref 0–0.2)
BASOPHILS NFR BLD AUTO: 1 %
BILIRUB SERPL-MCNC: <0.2 MG/DL (ref 0–1.2)
BUN SERPL-MCNC: 11 MG/DL (ref 6–24)
BUN/CREAT SERPL: 19 (ref 9–23)
CALCIUM SERPL-MCNC: 9.2 MG/DL (ref 8.7–10.2)
CHLORIDE SERPL-SCNC: 103 MMOL/L (ref 96–106)
CO2 SERPL-SCNC: 22 MMOL/L (ref 20–29)
CREAT SERPL-MCNC: 0.59 MG/DL (ref 0.57–1)
EGFR: 116 ML/MIN/1.73
EOSINOPHIL # BLD AUTO: 0.1 X10E3/UL (ref 0–0.4)
EOSINOPHIL NFR BLD AUTO: 1 %
ERYTHROCYTE [DISTWIDTH] IN BLOOD BY AUTOMATED COUNT: 13.2 % (ref 11.7–15.4)
GLOBULIN SER CALC-MCNC: 3 G/DL (ref 1.5–4.5)
GLUCOSE SERPL-MCNC: 83 MG/DL (ref 70–99)
HCT VFR BLD AUTO: 44.8 % (ref 34–46.6)
HGB BLD-MCNC: 14.2 G/DL (ref 11.1–15.9)
IMM GRANULOCYTES # BLD AUTO: 0 X10E3/UL (ref 0–0.1)
IMM GRANULOCYTES NFR BLD AUTO: 0 %
LYMPHOCYTES # BLD AUTO: 2.5 X10E3/UL (ref 0.7–3.1)
LYMPHOCYTES NFR BLD AUTO: 42 %
MCH RBC QN AUTO: 31.2 PG (ref 26.6–33)
MCHC RBC AUTO-ENTMCNC: 31.7 G/DL (ref 31.5–35.7)
MCV RBC AUTO: 99 FL (ref 79–97)
MONOCYTES # BLD AUTO: 0.6 X10E3/UL (ref 0.1–0.9)
MONOCYTES NFR BLD AUTO: 10 %
NEUTROPHILS # BLD AUTO: 2.9 X10E3/UL (ref 1.4–7)
NEUTROPHILS NFR BLD AUTO: 46 %
PLATELET # BLD AUTO: 244 X10E3/UL (ref 150–450)
POTASSIUM SERPL-SCNC: 4.2 MMOL/L (ref 3.5–5.2)
PROT SERPL-MCNC: 6.8 G/DL (ref 6–8.5)
RBC # BLD AUTO: 4.55 X10E6/UL (ref 3.77–5.28)
SODIUM SERPL-SCNC: 139 MMOL/L (ref 134–144)
TSH SERPL DL<=0.005 MIU/L-ACNC: 2.88 UIU/ML (ref 0.45–4.5)
WBC # BLD AUTO: 6.1 X10E3/UL (ref 3.4–10.8)

## 2022-10-26 ENCOUNTER — OFFICE VISIT (OUTPATIENT)
Dept: NEUROLOGY | Age: 42
End: 2022-10-26

## 2022-10-26 DIAGNOSIS — R20.2 PARESTHESIA OF BOTH HANDS: Primary | ICD-10-CM

## 2022-10-26 PROCEDURE — 95886 MUSC TEST DONE W/N TEST COMP: CPT | Performed by: PSYCHIATRY & NEUROLOGY

## 2022-10-26 PROCEDURE — 95911 NRV CNDJ TEST 9-10 STUDIES: CPT | Performed by: PSYCHIATRY & NEUROLOGY

## 2022-10-26 NOTE — PROGRESS NOTES
[facility]  [facility]  [address] [place] [address], [facility] [address]  Phone [phone] Fax [phone]  Test Date:  10/26/2022    Patient: Pratima Moreira : 1980 Physician: Na Romero DO   Sex: Female Height: 5' 2\" Ref Phys: Na Romero DO   ID#: [id number]  Weight: 192 lbs. Technician: eDvora Yepez     Patient Complaints:  Bilateral upper extremity paresthesias    NCV & EMG Findings:  All nerve conduction studies were within normal limits. All F Wave latencies were within normal limits. All examined muscles (as indicated in the following table) showed no evidence of electrical instability. Impression:  Extensive electrodiagnostic examination of the right upper extremity and additional nerve conduction studies of the left upper extremity reveals no abnormalities. In particular, there is no evidence of a right cervical motor radiculopathy. In addition, there is no evidence of a median neuropathy at or distal to the wrist (carpal tunnel syndrome). ___________________________  Paola Romero DO        Nerve Conduction Studies  Anti Sensory Summary Table     Stim Site NR Peak (ms) Norm Peak (ms) P-T Amp (µV) Norm P-T Amp Onset (ms) Site1 Site2 Delta-P (ms) Dist (cm) Terry (m/s) Norm Terry (m/s)   Left Median Anti Sensory (2nd Digit)  32.4°C   Wrist    2.6 <3.6 61.6 >10 2.1 Wrist 2nd Digit 2.6 14.0 54 >39   Right Median Anti Sensory (2nd Digit)  32.8°C   Wrist    2.9 <3.6 28.8 >10 2.3 Wrist 2nd Digit 2.9 14.0 48 >39   Left Radial Anti Sensory (Base 1st Digit)  32.8°C   Wrist    1.8 <3.1 52.4  1.3 Wrist Base 1st Digit 1.8 0.0     Right Radial Anti Sensory (Base 1st Digit)  32.8°C   Wrist    1.6 <3.1 56.1  1.1 Wrist Base 1st Digit 1.6 0.0     Left Ulnar Anti Sensory (5th Digit)  32.6°C   Wrist    2.6 <3.7 40.1 >15.0 2.1 Wrist 5th Digit 2.6 14.0 54 >38   Right Ulnar Anti Sensory (5th Digit)  32.8°C Wrist    2.3 <3.7 57.1 >15.0 1.9 Wrist 5th Digit 2.3 14.0 61 >38     Motor Summary Table     Stim Site NR Onset (ms) Norm Onset (ms) O-P Amp (mV) Norm O-P Amp Site1 Site2 Delta-0 (ms) Dist (cm) Terry (m/s) Norm Terry (m/s)   Left Median Motor (Abd Poll Brev)  32.8°C   Wrist    3.1 <4.2 5.4 >5 Elbow Wrist 3.2 21.0 66 >50   Elbow    6.3  5.3          Right Median Motor (Abd Poll Brev)  32.9°C   Wrist    3.4 <4.2 6.3 >5 Elbow Wrist 3.9 21.0 54 >50   Elbow    7.3  5.6          Left Ulnar Motor (Abd Dig Minimi)  33.9°C   Wrist    2.5 <4.2 10.3 >3 B Elbow Wrist 2.1 14.0 67 >53   B Elbow    4.6  9.7  A Elbow B Elbow 1.7 10.0 59 >53   A Elbow    6.3  8.5          Right Ulnar Motor (Abd Dig Minimi)  34.6°C   Wrist    2.3 <4.2 9.6 >3 B Elbow Wrist 2.0 14.0 70 >53   B Elbow    4.3  9.0  A Elbow B Elbow 1.8 10.0 56 >53   A Elbow    6.1  6.6            Comparison Summary Table     Stim Site NR Peak (ms) Norm Peak (ms) P-T Amp (µV) Site1 Site2 Delta-P (ms) Norm Delta (ms)   Left Median/Ulnar Palm Comparison (Wrist - 8cm)  33.6°C   Median Palm    1.5 <2.5 28.9 Median Palm Ulnar Palm 0.1 <0.3   Ulnar Palm    1.4 <2.5 22.6       Right Median/Ulnar Palm Comparison (Wrist - 8cm)  34.1°C   Median Palm    1.8 <2.5 17.4 Median Palm Ulnar Palm 0.3 <0.3   Ulnar Palm    1.5 <2.5 10.2         F Wave Studies     NR F-Lat (ms) Lat Norm (ms) L-R F-Lat (ms) L-R Lat Norm   Right Ulnar (Mrkrs) (Abd Dig Min)  34.8°C      23.95 <36  <2.5     EMG     Side Muscle Nerve Root Ins Act Fibs Psw Amp Dur Poly Recrt Int Lilibeth Crews Comment   Right 1stDorInt Ulnar C8-T1 Nml Nml Nml Nml Nml 0 Nml Nml    Right FlexPolLong Median (Ant Int) C7-8 Nml Nml Nml Nml Nml 0 Nml Nml    Right Ext Indicis Radial (Post Int) C7-8 Nml Nml Nml Nml Nml 0 Nml Nml    Right Biceps Musculocut C5-6 Nml Nml Nml Nml Nml 0 Nml Nml    Right Triceps Radial C6-7-8 Nml Nml Nml Nml Nml 0 Nml Nml    Right Deltoid Axillary C5-6 Nml Nml Nml Nml Nml 0 Nml Nml          Waveforms:

## 2023-01-04 RX ORDER — INTERFERON BETA-1A 44 UG/.5ML
44 INJECTION, SOLUTION SUBCUTANEOUS
Qty: 12 EACH | Refills: 5 | Status: SHIPPED | OUTPATIENT
Start: 2023-01-04

## 2023-05-06 RX ORDER — ALUMINUM ZIRCONIUM OCTACHLOROHYDREX GLY 16 G/100G
GEL TOPICAL
COMMUNITY

## 2023-05-10 ENCOUNTER — OFFICE VISIT (OUTPATIENT)
Age: 43
End: 2023-05-10

## 2023-05-10 VITALS
HEIGHT: 66 IN | OXYGEN SATURATION: 99 % | DIASTOLIC BLOOD PRESSURE: 76 MMHG | HEART RATE: 75 BPM | WEIGHT: 189 LBS | BODY MASS INDEX: 30.37 KG/M2 | SYSTOLIC BLOOD PRESSURE: 118 MMHG

## 2023-05-10 DIAGNOSIS — Z51.81 THERAPEUTIC DRUG MONITORING: ICD-10-CM

## 2023-05-10 DIAGNOSIS — G35 MULTIPLE SCLEROSIS (HCC): Primary | ICD-10-CM

## 2023-05-10 PROCEDURE — 99214 OFFICE O/P EST MOD 30 MIN: CPT | Performed by: PSYCHIATRY & NEUROLOGY

## 2023-05-10 ASSESSMENT — PATIENT HEALTH QUESTIONNAIRE - PHQ9
2. FEELING DOWN, DEPRESSED OR HOPELESS: 0
SUM OF ALL RESPONSES TO PHQ QUESTIONS 1-9: 0
1. LITTLE INTEREST OR PLEASURE IN DOING THINGS: 0
SUM OF ALL RESPONSES TO PHQ9 QUESTIONS 1 & 2: 0
SUM OF ALL RESPONSES TO PHQ QUESTIONS 1-9: 0

## 2023-05-10 NOTE — PROGRESS NOTES
mentions some recent R knee pain-has not seen her PCP for this. PMHx/ PSHx/ FHx/ SHx:  Reviewed and unchanged previous visit. Past Medical History:   Diagnosis Date    Dysuria     Elevated lipids     Multiple sclerosis (HCC)        ROS:  Comprehensive review of systems negative except for as noted above. Objective:       Meds:  Current Outpatient Medications   Medication Sig Dispense Refill    psyllium (METAMUCIL SMOOTH TEXTURE) 58.6 % powder Take by mouth      acetaminophen (TYLENOL) 325 MG tablet Take by mouth every 4 hours as needed      albuterol sulfate HFA (PROVENTIL;VENTOLIN;PROAIR) 108 (90 Base) MCG/ACT inhaler INHALE 2 PUFFS BY MOUTH EVERY 4 TO 6 HOURS AS NEEDED      ibuprofen (ADVIL;MOTRIN) 100 MG tablet Take 1 tablet by mouth every 6 hours as needed      interferon beta-1a (REBIF) 44 MCG/0.5ML injection Inject 0.5 mLs into the skin       No current facility-administered medications for this visit. Exam:  Visit Vitals  /76   Pulse 75   Ht 5' 6\" (1.676 m)   Wt 189 lb (85.7 kg)   SpO2 99%   BMI 30.51 kg/m²   NEUROLOGICAL EXAM:  General: Awake, alert, speech fluent  CN: PERRL, EOMI without nystagmus, VFF to confrontation, facial sensation and strength are normal and symmetric, hearing is intact to finger rub bilaterally, palate and tongue movements are intact and symmetric. Motor: Normal tone, bulk and strength bilaterally. Reflexes: 2/4 and symmetric, plantar stimulation is flexor. Coordination: FNF, RACHEL, HTS intact. Sensation: LT intact throughout.   Gait: Normal based, stable    LABS  Lab Results   Component Value Date    WBC 6.1 10/24/2022    HGB 14.2 10/24/2022    HCT 44.8 10/24/2022    MCV 99 (H) 10/24/2022     10/24/2022     Lab Results   Component Value Date     10/24/2022    K 4.2 10/24/2022     10/24/2022    CO2 22 10/24/2022    BUN 11 10/24/2022    CREATININE 0.59 10/24/2022    GLUCOSE 83 10/24/2022    CALCIUM 9.2 10/24/2022    PROT 6.8 10/24/2022

## 2023-05-17 ENCOUNTER — TELEPHONE (OUTPATIENT)
Age: 43
End: 2023-05-17

## 2023-05-18 DIAGNOSIS — F40.240 CLAUSTROPHOBIA: Primary | ICD-10-CM

## 2023-05-22 RX ORDER — LORAZEPAM 0.5 MG/1
TABLET ORAL
Qty: 2 TABLET | Refills: 0 | Status: SHIPPED | OUTPATIENT
Start: 2023-05-22 | End: 2023-05-31

## 2023-05-27 ENCOUNTER — HOSPITAL ENCOUNTER (OUTPATIENT)
Facility: HOSPITAL | Age: 43
End: 2023-05-27

## 2023-05-27 DIAGNOSIS — G35 MULTIPLE SCLEROSIS (HCC): ICD-10-CM

## 2023-05-27 PROCEDURE — 70553 MRI BRAIN STEM W/O & W/DYE: CPT

## 2023-05-27 PROCEDURE — 6360000004 HC RX CONTRAST MEDICATION

## 2023-05-27 PROCEDURE — A9579 GAD-BASE MR CONTRAST NOS,1ML: HCPCS

## 2023-05-27 RX ADMIN — GADOTERIDOL 15 ML: 279.3 INJECTION, SOLUTION INTRAVENOUS at 09:54

## 2023-06-01 ENCOUNTER — TELEPHONE (OUTPATIENT)
Age: 43
End: 2023-06-01

## 2023-06-01 NOTE — TELEPHONE ENCOUNTER
Called the Pt. Who handed the phone to family member speaking english two identifiers verified per Dr. Eddie Griffith:      MRI Brain is stable-no evidence of new/active demyelination    They stated an understanding.

## 2023-07-24 DIAGNOSIS — G35 MULTIPLE SCLEROSIS (HCC): ICD-10-CM

## 2023-07-24 LAB
ALBUMIN SERPL-MCNC: 3.6 G/DL (ref 3.5–5)
ALBUMIN/GLOB SERPL: 0.9 (ref 1.1–2.2)
ALP SERPL-CCNC: 92 U/L (ref 45–117)
ALT SERPL-CCNC: 36 U/L (ref 12–78)
ANION GAP SERPL CALC-SCNC: 4 MMOL/L (ref 5–15)
AST SERPL-CCNC: 28 U/L (ref 15–37)
BASOPHILS # BLD: 0 K/UL (ref 0–0.1)
BASOPHILS NFR BLD: 0 % (ref 0–1)
BILIRUB SERPL-MCNC: 0.3 MG/DL (ref 0.2–1)
BUN SERPL-MCNC: 8 MG/DL (ref 6–20)
BUN/CREAT SERPL: 12 (ref 12–20)
CALCIUM SERPL-MCNC: 8.4 MG/DL (ref 8.5–10.1)
CHLORIDE SERPL-SCNC: 108 MMOL/L (ref 97–108)
CO2 SERPL-SCNC: 25 MMOL/L (ref 21–32)
CREAT SERPL-MCNC: 0.68 MG/DL (ref 0.55–1.02)
DIFFERENTIAL METHOD BLD: NORMAL
EOSINOPHIL # BLD: 0.1 K/UL (ref 0–0.4)
EOSINOPHIL NFR BLD: 1 % (ref 0–7)
ERYTHROCYTE [DISTWIDTH] IN BLOOD BY AUTOMATED COUNT: 13.8 % (ref 11.5–14.5)
GLOBULIN SER CALC-MCNC: 3.8 G/DL (ref 2–4)
GLUCOSE SERPL-MCNC: 76 MG/DL (ref 65–100)
HCT VFR BLD AUTO: 44.5 % (ref 35–47)
HGB BLD-MCNC: 14.4 G/DL (ref 11.5–16)
IMM GRANULOCYTES # BLD AUTO: 0 K/UL (ref 0–0.04)
IMM GRANULOCYTES NFR BLD AUTO: 0 % (ref 0–0.5)
LYMPHOCYTES # BLD: 2.5 K/UL (ref 0.8–3.5)
LYMPHOCYTES NFR BLD: 32 % (ref 12–49)
MCH RBC QN AUTO: 31.3 PG (ref 26–34)
MCHC RBC AUTO-ENTMCNC: 32.4 G/DL (ref 30–36.5)
MCV RBC AUTO: 96.7 FL (ref 80–99)
MONOCYTES # BLD: 0.6 K/UL (ref 0–1)
MONOCYTES NFR BLD: 8 % (ref 5–13)
NEUTS SEG # BLD: 4.6 K/UL (ref 1.8–8)
NEUTS SEG NFR BLD: 59 % (ref 32–75)
NRBC # BLD: 0 K/UL (ref 0–0.01)
NRBC BLD-RTO: 0 PER 100 WBC
PLATELET # BLD AUTO: 255 K/UL (ref 150–400)
PMV BLD AUTO: 10 FL (ref 8.9–12.9)
POTASSIUM SERPL-SCNC: 3.7 MMOL/L (ref 3.5–5.1)
PROT SERPL-MCNC: 7.4 G/DL (ref 6.4–8.2)
RBC # BLD AUTO: 4.6 M/UL (ref 3.8–5.2)
SODIUM SERPL-SCNC: 137 MMOL/L (ref 136–145)
TSH SERPL DL<=0.05 MIU/L-ACNC: 1.4 UIU/ML (ref 0.36–3.74)
WBC # BLD AUTO: 7.7 K/UL (ref 3.6–11)

## 2023-07-25 ENCOUNTER — TELEPHONE (OUTPATIENT)
Age: 43
End: 2023-07-25

## 2023-07-25 NOTE — TELEPHONE ENCOUNTER
Called patient with the [phone] [address]  on the line (3 way). Phone call went straight to . Left a VM stating the doctor reviewed the lab results, \"Labs reviewed without significant abnormalities. \" ( left a VM)

## 2023-11-13 ENCOUNTER — OFFICE VISIT (OUTPATIENT)
Age: 43
End: 2023-11-13
Payer: SUBSIDIZED

## 2023-11-13 VITALS
WEIGHT: 180.4 LBS | OXYGEN SATURATION: 98 % | BODY MASS INDEX: 28.99 KG/M2 | SYSTOLIC BLOOD PRESSURE: 128 MMHG | HEIGHT: 66 IN | DIASTOLIC BLOOD PRESSURE: 70 MMHG | HEART RATE: 83 BPM

## 2023-11-13 DIAGNOSIS — Z51.81 THERAPEUTIC DRUG MONITORING: ICD-10-CM

## 2023-11-13 DIAGNOSIS — G35 MULTIPLE SCLEROSIS (HCC): Primary | ICD-10-CM

## 2023-11-13 DIAGNOSIS — R20.2 PARESTHESIA OF SKIN: ICD-10-CM

## 2023-11-13 PROCEDURE — 99214 OFFICE O/P EST MOD 30 MIN: CPT | Performed by: PSYCHIATRY & NEUROLOGY

## 2023-11-13 RX ORDER — INTERFERON BETA-1A 44 UG/.5ML
44 INJECTION, SOLUTION SUBCUTANEOUS
Qty: 6.6 ML | Refills: 11 | Status: ACTIVE | OUTPATIENT
Start: 2023-11-13

## 2023-11-13 ASSESSMENT — PATIENT HEALTH QUESTIONNAIRE - PHQ9
SUM OF ALL RESPONSES TO PHQ QUESTIONS 1-9: 0
2. FEELING DOWN, DEPRESSED OR HOPELESS: 0
SUM OF ALL RESPONSES TO PHQ9 QUESTIONS 1 & 2: 0
SUM OF ALL RESPONSES TO PHQ QUESTIONS 1-9: 0
1. LITTLE INTEREST OR PLEASURE IN DOING THINGS: 0

## 2023-11-13 NOTE — PROGRESS NOTES
07/24/2023    BUN 8 07/24/2023    CREATININE 0.68 07/24/2023    GLUCOSE 76 07/24/2023    CALCIUM 8.4 (L) 07/24/2023    PROT 7.4 07/24/2023    LABALBU 3.6 07/24/2023    BILITOT 0.3 07/24/2023    ALKPHOS 92 07/24/2023    AST 28 07/24/2023    ALT 36 07/24/2023    LABGLOM >60 07/24/2023    GFRAA >60 04/19/2022    AGRATIO 1.3 10/24/2022    GLOB 3.8 07/24/2023         IMAGING:  MRI Result (most recent):  MRI BRAIN W WO CONTRAST 05/27/2023    Narrative  Clinical history: Multiple sclerosis  INDICATION:   Multiple sclerosis    COMPARISON: 4/25/2022  TECHNIQUE: MR examination of the brain includes axial and sagittal T1 , axial  T2, axial FLAIR, axial gradient echo, axial DWI, coronal T1 . Pre and post  contrast axial T1-weighted imaging. Postcontrast T1-weighted imaging coronal  plane. CONTRAST: ProHance  FINDINGS:  There is no intracranial mass, hemorrhage or acute infarction. Minimal supratentorial and infratentorial abnormal T2 signal intensity foci left  frontal lobe periventricular. Cavum septa pellucida is redemonstrated. Otherwise; There is no abnormal parenchymal enhancement. There is no abnormal meningeal  enhancement demonstrated. The brain architecture is normal. There is no evidence  of midline shift or mass-effect. The ventricles are normal in size, position and  configuration. There are no extra-axial fluid collections. Major intracranial  vascular flow-voids are unremarkable. The orbits are grossly symmetric. Dural  venous sinuses are grossly unremarkable. There is no Chiari or syrinx. Pituitary  and infundibulum grossly unremarkable. Cerebellopontine angles are unremarkable. No skull base mass is identified. Cavernous sinuses are symmetric. The mastoid  air cells and are well pneumatized and clear. Impression  Stable mild infratentorial and supratentorial demyelinating disease burden. No evidence of active demyelination or interval progression.   No intracranial mass, hemorrhage or evidence of

## 2023-12-13 ENCOUNTER — TELEPHONE (OUTPATIENT)
Age: 43
End: 2023-12-13

## 2023-12-13 RX ORDER — INTERFERON BETA-1A 44 UG/.5ML
44 INJECTION, SOLUTION SUBCUTANEOUS
Qty: 6.6 ML | Refills: 11 | Status: ACTIVE | OUTPATIENT
Start: 2023-12-13

## 2023-12-21 ENCOUNTER — TELEPHONE (OUTPATIENT)
Age: 43
End: 2023-12-21

## 2023-12-21 NOTE — TELEPHONE ENCOUNTER
Patient's daughter called with her mother on the line to ask for a call back regarding her mother's REBIF medication.  PSR believes it is regarding a pending prior authorization request.

## 2023-12-28 NOTE — TELEPHONE ENCOUNTER
Re: Rebif    Reviewed MSOT on script and \"Patient receives free drug through [facility]. Prescription has been routed there. Please call us with any questions at [phone] opt. 2.\" Sent update to nurse and also sent e-mail to Jaymie Flores with [facility] for review.      Rcvd update to Jaymie Flores and per her review pt does not have insurance,

## 2023-12-29 NOTE — TELEPHONE ENCOUNTER
[facility] -    Phone:  [phone]      Returned the call to the Pt. However had to leave a voice mail. Gave the number for them to call [facility]. [facility]. Per the PA Specialist Rebif should be free to the Pt.

## 2024-05-02 LAB
ALBUMIN SERPL-MCNC: 4.2 G/DL (ref 3.9–4.9)
ALBUMIN/GLOB SERPL: 1.3 {RATIO} (ref 1.2–2.2)
ALP SERPL-CCNC: 101 IU/L (ref 44–121)
ALT SERPL-CCNC: 50 IU/L (ref 0–32)
AST SERPL-CCNC: 53 IU/L (ref 0–40)
BASOPHILS # BLD AUTO: 0 X10E3/UL (ref 0–0.2)
BASOPHILS NFR BLD AUTO: 1 %
BILIRUB SERPL-MCNC: 0.4 MG/DL (ref 0–1.2)
BUN SERPL-MCNC: 6 MG/DL (ref 6–24)
BUN/CREAT SERPL: 10 (ref 9–23)
CALCIUM SERPL-MCNC: 9.1 MG/DL (ref 8.7–10.2)
CHLORIDE SERPL-SCNC: 101 MMOL/L (ref 96–106)
CO2 SERPL-SCNC: 22 MMOL/L (ref 20–29)
CREAT SERPL-MCNC: 0.58 MG/DL (ref 0.57–1)
EGFRCR SERPLBLD CKD-EPI 2021: 116 ML/MIN/1.73
EOSINOPHIL # BLD AUTO: 0.1 X10E3/UL (ref 0–0.4)
EOSINOPHIL NFR BLD AUTO: 2 %
ERYTHROCYTE [DISTWIDTH] IN BLOOD BY AUTOMATED COUNT: 13.9 % (ref 11.7–15.4)
GLOBULIN SER CALC-MCNC: 3.2 G/DL (ref 1.5–4.5)
GLUCOSE SERPL-MCNC: 76 MG/DL (ref 70–99)
HCT VFR BLD AUTO: 45.6 % (ref 34–46.6)
HGB BLD-MCNC: 15 G/DL (ref 11.1–15.9)
IMM GRANULOCYTES # BLD AUTO: 0 X10E3/UL (ref 0–0.1)
IMM GRANULOCYTES NFR BLD AUTO: 0 %
LYMPHOCYTES # BLD AUTO: 2.5 X10E3/UL (ref 0.7–3.1)
LYMPHOCYTES NFR BLD AUTO: 35 %
MCH RBC QN AUTO: 31.7 PG (ref 26.6–33)
MCHC RBC AUTO-ENTMCNC: 32.9 G/DL (ref 31.5–35.7)
MCV RBC AUTO: 96 FL (ref 79–97)
MONOCYTES # BLD AUTO: 0.6 X10E3/UL (ref 0.1–0.9)
MONOCYTES NFR BLD AUTO: 9 %
NEUTROPHILS # BLD AUTO: 3.8 X10E3/UL (ref 1.4–7)
NEUTROPHILS NFR BLD AUTO: 53 %
PLATELET # BLD AUTO: 310 X10E3/UL (ref 150–450)
POTASSIUM SERPL-SCNC: 4.5 MMOL/L (ref 3.5–5.2)
PROT SERPL-MCNC: 7.4 G/DL (ref 6–8.5)
RBC # BLD AUTO: 4.73 X10E6/UL (ref 3.77–5.28)
SODIUM SERPL-SCNC: 137 MMOL/L (ref 134–144)
SPECIMEN STATUS REPORT: NORMAL
WBC # BLD AUTO: 7.1 X10E3/UL (ref 3.4–10.8)

## 2024-05-06 ENCOUNTER — HOSPITAL ENCOUNTER (OUTPATIENT)
Facility: HOSPITAL | Age: 44
Discharge: HOME | End: 2024-05-09
Attending: PSYCHIATRY & NEUROLOGY

## 2024-05-06 ENCOUNTER — TELEPHONE (OUTPATIENT)
Age: 44
End: 2024-05-06

## 2024-05-06 DIAGNOSIS — G35 MULTIPLE SCLEROSIS (HCC): ICD-10-CM

## 2024-05-06 PROCEDURE — 70553 MRI BRAIN STEM W/O & W/DYE: CPT

## 2024-05-06 PROCEDURE — A9579 GAD-BASE MR CONTRAST NOS,1ML: HCPCS | Performed by: PSYCHIATRY & NEUROLOGY

## 2024-05-06 PROCEDURE — 6360000004 HC RX CONTRAST MEDICATION: Performed by: PSYCHIATRY & NEUROLOGY

## 2024-05-06 RX ADMIN — GADOTERIDOL 18 ML: 279.3 INJECTION, SOLUTION INTRAVENOUS at 14:27

## 2024-05-06 NOTE — TELEPHONE ENCOUNTER
----- Message from Sarah Evans DO sent at 5/2/2024  2:56 PM EDT -----  Liver enzymes slightly elevated-will monitor. RMD  ----- Message -----  From: Noman Bear Incoming Amb Ref Lab Orders To [facility]  Sent: 5/2/2024   1:35 PM EDT  To: Sarah Evans DO

## 2024-05-06 NOTE — TELEPHONE ENCOUNTER
Called the Pt. She had a family member get on the phone to notify her that per Dr. Evans:  Liver enzymes slightly elevated-will monitor

## 2024-05-10 ENCOUNTER — TELEPHONE (OUTPATIENT)
Age: 44
End: 2024-05-10

## 2024-05-10 NOTE — TELEPHONE ENCOUNTER
----- Message from Sarah Evans DO sent at 5/7/2024 12:43 PM EDT -----  MRI Brain does not reveal evidence of any acute pathology. MS lesion burden appears stable, no active demyelination. RMD  ----- Message -----  From: Noman Bear Incoming Orders Results To [facility]  Sent: 5/6/2024   9:24 PM EDT  To: Sarah Evans DO

## 2024-05-10 NOTE — TELEPHONE ENCOUNTER
With the help of the  called the Pt. However had to leave a voice mail with the following information. Per Dr. Evans:      MRI Brain does not reveal evidence of any acute pathology. MS lesion burden appears stable, no active demyelination.

## 2024-05-20 ENCOUNTER — OFFICE VISIT (OUTPATIENT)
Age: 44
End: 2024-05-20

## 2024-05-20 VITALS
SYSTOLIC BLOOD PRESSURE: 136 MMHG | DIASTOLIC BLOOD PRESSURE: 72 MMHG | OXYGEN SATURATION: 100 % | HEART RATE: 70 BPM | BODY MASS INDEX: 32.12 KG/M2 | WEIGHT: 199 LBS

## 2024-05-20 DIAGNOSIS — G35 MULTIPLE SCLEROSIS (HCC): Primary | ICD-10-CM

## 2024-05-20 DIAGNOSIS — R74.01 TRANSAMINITIS: ICD-10-CM

## 2024-05-20 DIAGNOSIS — Z51.81 THERAPEUTIC DRUG MONITORING: ICD-10-CM

## 2024-05-20 PROCEDURE — 99214 OFFICE O/P EST MOD 30 MIN: CPT | Performed by: PSYCHIATRY & NEUROLOGY

## 2024-05-20 ASSESSMENT — PATIENT HEALTH QUESTIONNAIRE - PHQ9
SUM OF ALL RESPONSES TO PHQ QUESTIONS 1-9: 0
2. FEELING DOWN, DEPRESSED OR HOPELESS: NOT AT ALL
1. LITTLE INTEREST OR PLEASURE IN DOING THINGS: NOT AT ALL
SUM OF ALL RESPONSES TO PHQ QUESTIONS 1-9: 0
SUM OF ALL RESPONSES TO PHQ9 QUESTIONS 1 & 2: 0

## 2024-05-20 NOTE — PROGRESS NOTES
Multiple sclerosis (HCC)        2. Therapeutic drug monitoring        3. Transaminitis  Comprehensive Metabolic Panel      43 y.o. Latvian speaking female here for f/u due to abnormal MRI and h/o diplopia associated with L VARUN, L paresthesias during admission to [facility].  Subsequent MRI Brain revealed b/l subcortical white matter hyperintensities with enhancement of the L pericallosal/PV white matter and sarmad concerning for active demyelination vs alternative etiology.   MRA H/N with patent vasculature per records.  S/P LP 21 W/0R/elevated CSF MBP, 0 OCBs. Neurological examination is non-focal.  L paresthesias and VARUN appear resolved s/p IVMP and subsequent steroid taper.  Neuroimaging with contrast 4/18/20 was reviewed showing no evidence of active demyelination, mild T2 primarily subcortical hyperintensities mainly localized to the pericallosal/PV white matter and sarmad.  No evidence of cervical lesions.  MRI Brain last repeated 5/2024 and independently reviewed with overall stable MS lesion burden, no active demyelination. Clinically, she has remained stable without evidence of MS relapse.   We reviewed the multiple sclerosis diagnosis, prognosis, and implications. We reviewed the 3-pronged treatment strategy: treating acute flares, using preventative treatments to prevent future relapses and brain lesions, and treating residual symptoms. For long-term treatment, I recommend continuation of Rebif. Discussed medication dosage, usage, goals of therapy, and side effects. Will continue periodic laboratory monitoring while on therapy.   Plan:   Repeat CMP ~8/2024  Continue Rebif injections    Return in about 6 months (around 11/20/2024).    I have discussed the diagnosis with the patient today and the intended plan as seen in the above orders with both the patient as well as referring provider and/or PCP via electronic correspondence. The patient has received an after-visit summary and questions were answered concerning

## 2024-11-13 ENCOUNTER — TELEPHONE (OUTPATIENT)
Age: 44
End: 2024-11-13

## 2024-11-13 NOTE — TELEPHONE ENCOUNTER
Edmondson the  with [employer] is calling to ask if the patient is still taking REBIF or not.    They are asking so they can determine if they can close out the account/order.    Please call Sher back at [phone].

## 2024-11-14 NOTE — TELEPHONE ENCOUNTER
Pharmacy is requesting new RX.   LR: 12/13/2023  6.6 ml 11 refills  LOV 5/20/2024  NOV: 12/20/2024

## 2024-11-15 RX ORDER — INTERFERON BETA-1A 44 UG/.5ML
44 INJECTION, SOLUTION SUBCUTANEOUS
Qty: 6.6 ML | Refills: 3 | Status: ACTIVE | OUTPATIENT
Start: 2024-11-15

## 2024-11-15 RX ORDER — INTERFERON BETA-1A 44 UG/.5ML
44 INJECTION, SOLUTION SUBCUTANEOUS
Qty: 6.6 ML | Refills: 3 | OUTPATIENT
Start: 2024-11-15

## 2024-12-20 ENCOUNTER — OFFICE VISIT (OUTPATIENT)
Age: 44
End: 2024-12-20
Payer: SUBSIDIZED

## 2024-12-20 VITALS
WEIGHT: 204 LBS | OXYGEN SATURATION: 98 % | BODY MASS INDEX: 32.93 KG/M2 | HEART RATE: 91 BPM | SYSTOLIC BLOOD PRESSURE: 122 MMHG | DIASTOLIC BLOOD PRESSURE: 60 MMHG

## 2024-12-20 DIAGNOSIS — Z51.81 THERAPEUTIC DRUG MONITORING: ICD-10-CM

## 2024-12-20 DIAGNOSIS — G35 MULTIPLE SCLEROSIS (HCC): Primary | ICD-10-CM

## 2024-12-20 PROCEDURE — 99214 OFFICE O/P EST MOD 30 MIN: CPT | Performed by: PSYCHIATRY & NEUROLOGY

## 2024-12-20 RX ORDER — DIMETHYL FUMARATE 120 MG/1
120 CAPSULE ORAL 2 TIMES DAILY
Qty: 14 CAPSULE | Refills: 0 | Status: SHIPPED | OUTPATIENT
Start: 2024-12-20 | End: 2024-12-27

## 2024-12-20 RX ORDER — DIMETHYL FUMARATE 240 MG/1
240 CAPSULE ORAL 2 TIMES DAILY
Qty: 180 CAPSULE | Refills: 1 | Status: SHIPPED | OUTPATIENT
Start: 2024-12-20

## 2024-12-20 NOTE — PROGRESS NOTES
delayed release capsule    dimethyl fumarate (TECFIDERA) 240 MG delayed release capsule      2. Therapeutic drug monitoring          44 y.o. Luxembourger speaking female here for f/u due to abnormal MRI and h/o diplopia associated with L VARUN, L paresthesias during admission to [facility].  Subsequent MRI Brain revealed b/l subcortical white matter hyperintensities with enhancement of the L pericallosal/PV white matter and sarmad concerning for active demyelination vs alternative etiology.   MRA H/N with patent vasculature per records.  S/P LP 21 W/0R/elevated CSF MBP, 0 OCBs. Neurological examination is non-focal.  L paresthesias and VARUN appear resolved s/p IVMP and subsequent steroid taper. Neuroimaging with contrast 4/18/20 was reviewed showing no evidence of active demyelination, mild T2 primarily subcortical hyperintensities mainly localized to the pericallosal/PV white matter and sarmad.  No evidence of cervical lesions.  MRI Brain last repeated 5/2024 and independently reviewed with overall stable MS lesion burden, no active demyelination. Clinically, she has remained stable without evidence of MS relapse. She admits to more recent non-compliance with DMT since last visit due to injection site reaction and depression which she feels is related to medication. Symptoms have improved s/p discontinuation of Rebif. Discussed concern for potential MS relapse off of DMT. Will obtain updated neuroimaging today to assess for any evidence of disease progression. Reviewed alternative options for treatment of RRMS including Tecfidera and potential for GI upset, opportunistic infection, lymphopenia and need for periodic laboratory monitoring. She elects to proceed with treatment.   Plan:   Repeat MRI Brain WWO, CBC w/diff, CMP  Start Tecfidera for RRMS     Return in about 6 months (around 6/20/2025).    I have discussed the diagnosis with the patient today and the intended plan as seen in the above orders with both the patient as well as

## 2024-12-21 LAB
ALBUMIN SERPL-MCNC: 4.4 G/DL (ref 3.9–4.9)
ALP SERPL-CCNC: 121 IU/L (ref 44–121)
ALT SERPL-CCNC: 24 IU/L (ref 0–32)
AST SERPL-CCNC: 26 IU/L (ref 0–40)
BASOPHILS # BLD AUTO: 0 X10E3/UL (ref 0–0.2)
BASOPHILS NFR BLD AUTO: 1 %
BILIRUB SERPL-MCNC: 0.2 MG/DL (ref 0–1.2)
BUN SERPL-MCNC: 14 MG/DL (ref 6–24)
BUN/CREAT SERPL: 18 (ref 9–23)
CALCIUM SERPL-MCNC: 9.5 MG/DL (ref 8.7–10.2)
CHLORIDE SERPL-SCNC: 103 MMOL/L (ref 96–106)
CO2 SERPL-SCNC: 21 MMOL/L (ref 20–29)
CREAT SERPL-MCNC: 0.8 MG/DL (ref 0.57–1)
EGFRCR SERPLBLD CKD-EPI 2021: 94 ML/MIN/1.73
EOSINOPHIL # BLD AUTO: 0.1 X10E3/UL (ref 0–0.4)
EOSINOPHIL NFR BLD AUTO: 1 %
ERYTHROCYTE [DISTWIDTH] IN BLOOD BY AUTOMATED COUNT: 12.7 % (ref 11.7–15.4)
GLOBULIN SER CALC-MCNC: 3.2 G/DL (ref 1.5–4.5)
GLUCOSE SERPL-MCNC: 88 MG/DL (ref 70–99)
HCT VFR BLD AUTO: 48.8 % (ref 34–46.6)
HGB BLD-MCNC: 15.7 G/DL (ref 11.1–15.9)
IMM GRANULOCYTES # BLD AUTO: 0 X10E3/UL (ref 0–0.1)
IMM GRANULOCYTES NFR BLD AUTO: 0 %
LYMPHOCYTES # BLD AUTO: 2.2 X10E3/UL (ref 0.7–3.1)
LYMPHOCYTES NFR BLD AUTO: 34 %
MCH RBC QN AUTO: 30.8 PG (ref 26.6–33)
MCHC RBC AUTO-ENTMCNC: 32.2 G/DL (ref 31.5–35.7)
MCV RBC AUTO: 96 FL (ref 79–97)
MONOCYTES # BLD AUTO: 0.4 X10E3/UL (ref 0.1–0.9)
MONOCYTES NFR BLD AUTO: 7 %
NEUTROPHILS # BLD AUTO: 3.7 X10E3/UL (ref 1.4–7)
NEUTROPHILS NFR BLD AUTO: 57 %
PLATELET # BLD AUTO: 333 X10E3/UL (ref 150–450)
POTASSIUM SERPL-SCNC: 4.3 MMOL/L (ref 3.5–5.2)
PROT SERPL-MCNC: 7.6 G/DL (ref 6–8.5)
RBC # BLD AUTO: 5.1 X10E6/UL (ref 3.77–5.28)
SODIUM SERPL-SCNC: 141 MMOL/L (ref 134–144)
WBC # BLD AUTO: 6.4 X10E3/UL (ref 3.4–10.8)

## 2024-12-23 ENCOUNTER — TELEPHONE (OUTPATIENT)
Age: 44
End: 2024-12-23

## 2024-12-23 DIAGNOSIS — G35 MULTIPLE SCLEROSIS (HCC): Primary | ICD-10-CM

## 2024-12-23 NOTE — TELEPHONE ENCOUNTER
The below message was emailed by Charity from [facility] [employer]:    Good afternoon,  Re: Lotus Hollandmundo [birth year] Tecfidera  The patient does not have insurance.  Was Dr. Evans wanting to try and get her free drug through a ?  If so, it is not available for this medication. She may want to consider Vumerity and then you can submit for PAP.

## 2024-12-23 NOTE — TELEPHONE ENCOUNTER
Called and spoke with the Pt. With a family member helping to translate. With the following message from :    Please let her know tecfidera has no drug program for her, therefore will start Vumerity instead which is also taken twice daily. Monitor for any GI upset (similar to tecfidera), opportunistic infections. Will require periodic labs to monitor for lymphopenia (similar to tecfidera). RMD     Let her know we will complete the start form and process.

## 2024-12-23 NOTE — TELEPHONE ENCOUNTER
Called and spoke with the Pt. With family translating: per Dr. Evans;    Labs reviewed without significant abnormalities. ROLF

## 2024-12-23 NOTE — TELEPHONE ENCOUNTER
----- Message from Dr. Sarah Evans DO sent at 12/23/2024 11:00 AM EST -----  Labs reviewed without significant abnormalities. RMD  ----- Message -----  From: Noman Bear Incoming Amb Ref Lab Orders To [facility]  Sent: 12/21/2024   5:36 AM EST  To: Sarah Evans DO

## 2025-01-24 ENCOUNTER — TELEPHONE (OUTPATIENT)
Dept: PHARMACY | Facility: CLINIC | Age: 45
End: 2025-01-24

## 2025-01-28 NOTE — TELEPHONE ENCOUNTER
Spoke with the Pt. She will be coming to [place] to sign the form. Also spoke to the representative for [employer] and once faxed they will help with assistance with cost.

## 2025-02-21 ENCOUNTER — TELEPHONE (OUTPATIENT)
Age: 45
End: 2025-02-21

## 2025-02-21 NOTE — TELEPHONE ENCOUNTER
[facility] requesting medication refill for medication DIROXIMEL FUMARATE 231mg.    Fax # [phone]

## 2025-06-06 ENCOUNTER — TELEPHONE (OUTPATIENT)
Age: 45
End: 2025-06-06

## 2025-06-06 NOTE — TELEPHONE ENCOUNTER
I reached patient and spoke with them about our need to cancel upcoming scheduled appointment in July. I explained to patient that the future funding of our [employer] program is uncertain and that we are unable to reschedule their appointment at this time. Patient instructed to follow up with their PCP to discuss options OR Patient offered a list of [facility] care clinics to establish care and mailing address verified.  [facility]  #  [id number]. NAVJOT MEDEROS RN

## 2025-07-11 ENCOUNTER — TELEPHONE (OUTPATIENT)
Age: 45
End: 2025-07-11

## 2025-07-11 NOTE — TELEPHONE ENCOUNTER
Called patient to try and reschedule appointment that was originally set for 7/08, but was cancelled.  Was able to get in contact with patient and arrange new date for needed appointment.   Citlali Payne